# Patient Record
Sex: MALE | Race: OTHER | Employment: FULL TIME | URBAN - METROPOLITAN AREA
[De-identification: names, ages, dates, MRNs, and addresses within clinical notes are randomized per-mention and may not be internally consistent; named-entity substitution may affect disease eponyms.]

---

## 2017-02-02 ENCOUNTER — HOSPITAL ENCOUNTER (EMERGENCY)
Age: 67
Discharge: HOME | End: 2017-02-02
Attending: EMERGENCY MEDICINE
Payer: MEDICARE

## 2017-02-02 ENCOUNTER — OFFICE VISIT (OUTPATIENT)
Dept: FAMILY MEDICINE CLINIC | Age: 67
End: 2017-02-02

## 2017-02-02 ENCOUNTER — APPOINTMENT (OUTPATIENT)
Dept: CT IMAGING | Age: 67
End: 2017-02-02
Attending: EMERGENCY MEDICINE
Payer: MEDICARE

## 2017-02-02 VITALS
HEART RATE: 65 BPM | BODY MASS INDEX: 28.75 KG/M2 | DIASTOLIC BLOOD PRESSURE: 86 MMHG | OXYGEN SATURATION: 94 % | RESPIRATION RATE: 16 BRPM | SYSTOLIC BLOOD PRESSURE: 153 MMHG | HEIGHT: 65 IN | WEIGHT: 172.56 LBS | TEMPERATURE: 98 F

## 2017-02-02 DIAGNOSIS — G51.0 BELL'S PALSY: Primary | ICD-10-CM

## 2017-02-02 LAB
ALBUMIN SERPL BCP-MCNC: 3.9 G/DL (ref 3.5–5)
ALBUMIN/GLOB SERPL: 1 {RATIO} (ref 1.1–2.2)
ALP SERPL-CCNC: 80 U/L (ref 45–117)
ALT SERPL-CCNC: 38 U/L (ref 12–78)
ANION GAP BLD CALC-SCNC: 8 MMOL/L (ref 5–15)
AST SERPL W P-5'-P-CCNC: 25 U/L (ref 15–37)
BILIRUB SERPL-MCNC: 0.6 MG/DL (ref 0.2–1)
BUN SERPL-MCNC: 16 MG/DL (ref 6–20)
BUN/CREAT SERPL: 18 (ref 12–20)
CALCIUM SERPL-MCNC: 9.2 MG/DL (ref 8.5–10.1)
CHLORIDE SERPL-SCNC: 104 MMOL/L (ref 97–108)
CO2 SERPL-SCNC: 29 MMOL/L (ref 21–32)
CREAT SERPL-MCNC: 0.88 MG/DL (ref 0.7–1.3)
ERYTHROCYTE [DISTWIDTH] IN BLOOD BY AUTOMATED COUNT: 12.3 % (ref 11.5–14.5)
GLOBULIN SER CALC-MCNC: 3.9 G/DL (ref 2–4)
GLUCOSE SERPL-MCNC: 114 MG/DL (ref 65–100)
HCT VFR BLD AUTO: 45.9 % (ref 36.6–50.3)
HGB BLD-MCNC: 15.5 G/DL (ref 12.1–17)
MCH RBC QN AUTO: 29.7 PG (ref 26–34)
MCHC RBC AUTO-ENTMCNC: 33.8 G/DL (ref 30–36.5)
MCV RBC AUTO: 87.9 FL (ref 80–99)
PLATELET # BLD AUTO: 235 K/UL (ref 150–400)
POTASSIUM SERPL-SCNC: 3.8 MMOL/L (ref 3.5–5.1)
PROT SERPL-MCNC: 7.8 G/DL (ref 6.4–8.2)
RBC # BLD AUTO: 5.22 M/UL (ref 4.1–5.7)
SODIUM SERPL-SCNC: 141 MMOL/L (ref 136–145)
WBC # BLD AUTO: 8.3 K/UL (ref 4.1–11.1)

## 2017-02-02 PROCEDURE — 70450 CT HEAD/BRAIN W/O DYE: CPT

## 2017-02-02 PROCEDURE — 74011636637 HC RX REV CODE- 636/637: Performed by: EMERGENCY MEDICINE

## 2017-02-02 PROCEDURE — 85027 COMPLETE CBC AUTOMATED: CPT | Performed by: EMERGENCY MEDICINE

## 2017-02-02 PROCEDURE — 99284 EMERGENCY DEPT VISIT MOD MDM: CPT

## 2017-02-02 PROCEDURE — 80053 COMPREHEN METABOLIC PANEL: CPT | Performed by: EMERGENCY MEDICINE

## 2017-02-02 RX ORDER — PREDNISONE 20 MG/1
60 TABLET ORAL DAILY
Qty: 18 TAB | Refills: 0 | Status: SHIPPED | OUTPATIENT
Start: 2017-02-02 | End: 2017-02-08

## 2017-02-02 RX ORDER — PREDNISONE 20 MG/1
60 TABLET ORAL
Status: COMPLETED | OUTPATIENT
Start: 2017-02-02 | End: 2017-02-02

## 2017-02-02 RX ADMIN — PREDNISONE 60 MG: 20 TABLET ORAL at 13:54

## 2017-02-02 NOTE — ED PROVIDER NOTES
HPI Comments: 73 y.o. male with past medical history significant for hyperlipidemia, lower urinary tract symptoms, erectile dysfunction, and DM who presents from home with chief complaint of facial droop. Pt c/o facial droop that onset 20 hours ago when he got home from work and his wife noticed the R side of his face drooping. Pt states that he has R sided neck pain, R sided HA, and he can't taste. Pt denies taking any pain medication at home, just 1 pill for his prostate. Pt denies any hx of heart disease or stroke. Pt denies any numbness or weakness in arms or legs. There are no other acute medical concerns at this time. PCP: Last Quach NP    Note written by John Mccrary, as dictated by Mitchell Parmar MD 1:53 PM         The history is provided by the patient. No  was used. Past Medical History:   Diagnosis Date    Diabetes mellitus type 2, controlled ([address]     Erectile dysfunction      with premature ejaculation    Hyperlipidemia     Lower urinary tract symptoms (LUTS)        History reviewed. No pertinent past surgical history. History reviewed. No pertinent family history. Social History     Social History    Marital status:      Spouse name: N/A    Number of children: N/A    Years of education: N/A     Occupational History    Not on file. Social History Main Topics    Smoking status: Never Smoker    Smokeless tobacco: Never Used    Alcohol use No    Drug use: No    Sexual activity: Not on file     Other Topics Concern    Not on file     Social History Narrative    10/2015: Finished high school in [place], He is working as a , used to work as a  in [place]. Lives with wife and son. ALLERGIES: Review of patient's allergies indicates no known allergies. Review of Systems   Constitutional: Negative for diaphoresis and fever. HENT: Negative for facial swelling.     Eyes: Negative for visual disturbance. Respiratory: Negative for cough. Cardiovascular: Negative for chest pain. Gastrointestinal: Negative for abdominal pain. Genitourinary: Negative for dysuria. Musculoskeletal: Negative for joint swelling. Skin: Negative for rash. Neurological: Positive for facial asymmetry and numbness (R side of face). Negative for headaches. Hematological: Negative for adenopathy. Psychiatric/Behavioral: Negative for suicidal ideas. All other systems reviewed and are negative. Vitals:    02/02/17 1302 02/02/17 1320   BP: (!) 178/102 155/77   Pulse: 65    Resp: 16    Temp: 98.1 °F (36.7 °C)    Weight: 78.3 kg (172 lb 9 oz)    Height: 5' 5\" (1.651 m)             Physical Exam   Constitutional: He is oriented to person, place, and time. He appears well-developed and well-nourished. No distress. HENT:   Head: Normocephalic and atraumatic. Mouth/Throat: Oropharynx is clear and moist.   Eyes: Pupils are equal, round, and reactive to light. Neck: Normal range of motion. Neck supple. Cardiovascular: Normal rate, regular rhythm, normal heart sounds and intact distal pulses. Pulmonary/Chest: Effort normal and breath sounds normal. No respiratory distress. Abdominal: Soft. Bowel sounds are normal. He exhibits no distension. There is no tenderness. Musculoskeletal: Normal range of motion. He exhibits no edema. Neurological: He is alert and oriented to person, place, and time. R side upper facial motor neuron weakness. R side lower facial droop   Skin: Skin is warm and dry. Nursing note and vitals reviewed. Note written by John Marin, as dictated by Shavon Perry MD 1:54 PM      MDM  Number of Diagnoses or Management Options  Bell's palsy:   Diagnosis management comments: A:  Symptoms suggestive of Bell's Palsy. Exam consistent with this. VS stable with elevated BP. Since h/o htn and DM, will check labs and head ct.     P:  Head ct  Labs  reassess    ED Course Procedures    Labs and head CT unremarkable. Will treat for Bell's Palsy with Prednisone. Stable for discharge home. F/u with PCP.

## 2017-02-02 NOTE — ED TRIAGE NOTES
Headache for a couples of days, right sided facial droop started yesterday, denies leg or arm weakness or numbness, denies fever, denies n/v, pt unable to lift right eyebrow

## 2017-02-02 NOTE — DISCHARGE INSTRUCTIONS
Bell's Palsy: Care Instructions  Your Care Instructions    Bell's palsy is paralysis or weakness of the muscles on one side of the face. Often people with Bell's palsy have a droop on one side of the mouth and have trouble completely shutting the eye on the same side. Bell's palsy can also interfere with the sense of taste. These things happen when a nerve in your face becomes inflamed. Bell's palsy is not caused by a stroke. The cause of the nerve inflammation is not known. But some experts think that a virus may cause it. Because of this, doctors sometimes prescribe antiviral medicine to treat it. You also may get medicine to reduce swelling. Bell's palsy usually gets better on its own in a few weeks or months. Follow-up care is a key part of your treatment and safety. Be sure to make and go to all appointments, and call your doctor if you are having problems. It's also a good idea to know your test results and keep a list of the medicines you take. How can you care for yourself at home? · Take your medicines exactly as prescribed. Call your doctor if you think you are having a problem with your medicine. You will get more details on the specific medicines your doctor prescribes. · Use artificial tears or ointment if your eyes are too dry. Bell's palsy can make your lower eyelid droop, causing a dry eye. · If you cannot completely close your eye, consider using an eye patch while you sleep. · Help yourself blink by using your finger to close and open your eyelid. This may help keep your eye moist.  · Wear glasses or goggles to keep dust and dirt out of your eye. · As feeling comes back to your face, massage your forehead, cheeks, and lips. Massage may make the muscles in your face stronger. · Brush and floss your teeth often to help prevent tooth decay. Bell's palsy can dry up the spit on one side of your mouth. This increases the risk of tooth decay. When should you call for help?   Call 911 anytime you think you may need emergency care. For example, call if:  · You have symptoms of a stroke. These may include:  ¨ Sudden numbness, tingling, weakness, or loss of movement in your face, arm, or leg, especially on only one side of your body. ¨ Sudden vision changes. ¨ Sudden trouble speaking. ¨ Sudden confusion or trouble understanding simple statements. ¨ Sudden problems with walking or balance. ¨ A sudden, severe headache that is different from past headaches. Call your doctor now or seek immediate medical care if:  · You have numbness or weakness that spreads beyond one side of your face. · You have a skin rash or eye pain or redness, or light bothers your eyes. · You have a new or worse headache. Watch closely for changes in your health, and be sure to contact your doctor if:  · You do not get better as expected. Where can you learn more? Go to http://jayda-reji.info/. Enter P168 in the search box to learn more about \"Bell's Palsy: Care Instructions. \"  Current as of: February 19, 2016  Content Version: 11.1  © 3833-9576 [employer]. Care instructions adapted under license by [employer] (which disclaims liability or warranty for this information). If you have questions about a medical condition or this instruction, always ask your healthcare professional. [address] any warranty or liability for your use of this information. Bell's Palsy: Care Instructions  Your Care Instructions    Bell's palsy is paralysis or weakness of the muscles on one side of the face. Often people with Bell's palsy have a droop on one side of the mouth and have trouble completely shutting the eye on the same side. Bell's palsy can also interfere with the sense of taste. These things happen when a nerve in your face becomes inflamed. Bell's palsy is not caused by a stroke. The cause of the nerve inflammation is not known.  But some experts think that a virus may cause it. Because of this, doctors sometimes prescribe antiviral medicine to treat it. You also may get medicine to reduce swelling. Bell's palsy usually gets better on its own in a few weeks or months. Follow-up care is a key part of your treatment and safety. Be sure to make and go to all appointments, and call your doctor if you are having problems. It's also a good idea to know your test results and keep a list of the medicines you take. How can you care for yourself at home? · Take your medicines exactly as prescribed. Call your doctor if you think you are having a problem with your medicine. You will get more details on the specific medicines your doctor prescribes. · Use artificial tears or ointment if your eyes are too dry. Bell's palsy can make your lower eyelid droop, causing a dry eye. · If you cannot completely close your eye, consider using an eye patch while you sleep. · Help yourself blink by using your finger to close and open your eyelid. This may help keep your eye moist.  · Wear glasses or goggles to keep dust and dirt out of your eye. · As feeling comes back to your face, massage your forehead, cheeks, and lips. Massage may make the muscles in your face stronger. · Brush and floss your teeth often to help prevent tooth decay. Bell's palsy can dry up the spit on one side of your mouth. This increases the risk of tooth decay. When should you call for help? Call 911 anytime you think you may need emergency care. For example, call if:  · You have symptoms of a stroke. These may include:  ¨ Sudden numbness, tingling, weakness, or loss of movement in your face, arm, or leg, especially on only one side of your body. ¨ Sudden vision changes. ¨ Sudden trouble speaking. ¨ Sudden confusion or trouble understanding simple statements. ¨ Sudden problems with walking or balance. ¨ A sudden, severe headache that is different from past headaches.   Call your doctor now or seek immediate medical care if:  · You have numbness or weakness that spreads beyond one side of your face. · You have a skin rash or eye pain or redness, or light bothers your eyes. · You have a new or worse headache. Watch closely for changes in your health, and be sure to contact your doctor if:  · You do not get better as expected. Where can you learn more? Go to http://jayda-reji.info/. Enter P168 in the search box to learn more about \"Bell's Palsy: Care Instructions. \"  Current as of: February 19, 2016  Content Version: 11.1  © 4328-4947 [employer]. Care instructions adapted under license by [employer] (which disclaims liability or warranty for this information). If you have questions about a medical condition or this instruction, always ask your healthcare professional. [address] any warranty or liability for your use of this information. We hope that we have addressed all of your medical concerns. The examination and treatment you received in the Emergency Department were for an emergent problem and were not intended as complete care. It is important that you follow up with your healthcare provider(s) for ongoing care. If your symptoms worsen or do not improve as expected, and you are unable to reach your usual health care provider(s), you should return to the Emergency Department. Today's healthcare is undergoing tremendous change, and patient satisfaction surveys are one of the many tools to assess the quality of medical care. You may receive a survey from the [employer] organization regarding your experience in the Emergency Department. I hope that your experience has been completely positive, particularly the medical care that I provided. As such, please participate in the survey; anything less than excellent does not meet my expectations or intentions.         [address] [facility] and [address] [place] [address] participate in nationally recognized quality of care measures. If your blood pressure is greater than 120/80, as reported below, we urge that you seek medical care to address the potential of high blood pressure, commonly known as hypertension. Hypertension can be hereditary or can be caused by certain medical conditions, pain, stress, or \"white coat syndrome. \"       Please make an appointment with your health care provider(s) for follow up of your Emergency Department visit. VITALS:   Patient Vitals for the past 8 hrs:   Temp Pulse Resp BP SpO2   02/02/17 1400 - 62 11 157/82 95 %   02/02/17 1330 - 65 14 170/76 97 %   02/02/17 1320 - - - 155/77 -   02/02/17 1302 98.1 °F (36.7 °C) 65 16 (!) 178/102 -          Thank you for allowing us to provide you with medical care today. We realize that you have many choices for your emergency care needs. Please choose us in the future for any continued health care needs.       Dickson Savage MD    [facility]   Office: [phone]            Recent Results (from the past 24 hour(s))   CBC W/O DIFF    Collection Time: 02/02/17  1:06 PM   Result Value Ref Range    WBC 8.3 4.1 - 11.1 K/uL    RBC 5.22 4.10 - 5.70 M/uL    HGB 15.5 12.1 - 17.0 g/dL    HCT 45.9 36.6 - 50.3 %    MCV 87.9 80.0 - 99.0 FL    MCH 29.7 26.0 - 34.0 PG    MCHC 33.8 30.0 - 36.5 g/dL    RDW 12.3 11.5 - 14.5 %    PLATELET 921 028 - 710 K/uL   METABOLIC PANEL, COMPREHENSIVE    Collection Time: 02/02/17  1:06 PM   Result Value Ref Range    Sodium 141 136 - 145 mmol/L    Potassium 3.8 3.5 - 5.1 mmol/L    Chloride 104 97 - 108 mmol/L    CO2 29 21 - 32 mmol/L    Anion gap 8 5 - 15 mmol/L    Glucose 114 (H) 65 - 100 mg/dL    BUN 16 6 - 20 MG/DL    Creatinine 0.88 0.70 - 1.30 MG/DL    BUN/Creatinine ratio 18 12 - 20      GFR est AA >60 >60 ml/min/1.73m2    GFR est non-AA >60 >60 ml/min/1.73m2    Calcium 9.2 8.5 - 10.1 MG/DL    Bilirubin, total 0.6 0.2 - 1.0 MG/DL ALT (SGPT) 38 12 - 78 U/L    AST (SGOT) 25 15 - 37 U/L    Alk. phosphatase 80 45 - 117 U/L    Protein, total 7.8 6.4 - 8.2 g/dL    Albumin 3.9 3.5 - 5.0 g/dL    Globulin 3.9 2.0 - 4.0 g/dL    A-G Ratio 1.0 (L) 1.1 - 2.2         Ct Head Wo Cont    Result Date: 2/2/2017  EXAM:  CT HEAD WO CONT INDICATION:   Facial muscle weakness/paralysis COMPARISON: None. TECHNIQUE: Unenhanced CT of the head was performed using 5 mm images. Brain and bone windows were generated. CT dose reduction was achieved through use of a standardized protocol tailored for this examination and automatic exposure control for dose modulation. FINDINGS: The ventricles and sulci are normal in size, shape and configuration and midline. There is no significant white matter disease. There is no intracranial hemorrhage, extra-axial collection, mass, mass effect or midline shift. The basilar cisterns are open. No acute infarct is identified. The bone windows demonstrate no abnormalities. The visualized portions of the paranasal sinuses and mastoid air cells are clear.      IMPRESSION: No acute intracranial abnormality

## 2017-02-02 NOTE — PROGRESS NOTES
Pt presented for right sided facial weakness he woke up with this morning (hours ago). Never had this before. Some difficulty tasting coffee. Some pain on right side of face for 3 days prior, no rash. Otherwise feeling well. Speech sounds normal.  Right sided facial weakness pronounced. After discussion at the  we sent him to ER. I suspect he has [address] but would benefit from stroke work up given his age and diabetes.

## 2017-02-03 ENCOUNTER — PATIENT OUTREACH (OUTPATIENT)
Dept: FAMILY MEDICINE CLINIC | Age: 67
End: 2017-02-03

## 2017-02-07 ENCOUNTER — OFFICE VISIT (OUTPATIENT)
Dept: FAMILY MEDICINE CLINIC | Age: 67
End: 2017-02-07

## 2017-02-07 VITALS
HEIGHT: 65 IN | DIASTOLIC BLOOD PRESSURE: 87 MMHG | HEART RATE: 72 BPM | TEMPERATURE: 98.5 F | WEIGHT: 175.8 LBS | SYSTOLIC BLOOD PRESSURE: 149 MMHG | BODY MASS INDEX: 29.29 KG/M2 | RESPIRATION RATE: 12 BRPM | OXYGEN SATURATION: 95 %

## 2017-02-07 DIAGNOSIS — E78.5 DYSLIPIDEMIA: ICD-10-CM

## 2017-02-07 DIAGNOSIS — E11.65 TYPE 2 DIABETES MELLITUS WITH HYPERGLYCEMIA, WITHOUT LONG-TERM CURRENT USE OF INSULIN (HCC): Primary | ICD-10-CM

## 2017-02-07 DIAGNOSIS — G51.0 RIGHT-SIDED BELL'S PALSY: ICD-10-CM

## 2017-02-07 DIAGNOSIS — N40.1 BENIGN NON-NODULAR PROSTATIC HYPERPLASIA WITH LOWER URINARY TRACT SYMPTOMS: ICD-10-CM

## 2017-02-07 DIAGNOSIS — Z71.89 ACP (ADVANCE CARE PLANNING): ICD-10-CM

## 2017-02-07 DIAGNOSIS — Z00.00 MEDICARE ANNUAL WELLNESS VISIT, INITIAL: ICD-10-CM

## 2017-02-07 RX ORDER — METFORMIN HYDROCHLORIDE 500 MG/1
500 TABLET ORAL 2 TIMES DAILY WITH MEALS
Qty: 60 TAB | Refills: 5 | Status: SHIPPED | OUTPATIENT
Start: 2017-02-07 | End: 2017-12-04 | Stop reason: DRUGHIGH

## 2017-02-07 RX ORDER — ACYCLOVIR 400 MG/1
400 TABLET ORAL
Qty: 35 TAB | Refills: 0 | Status: SHIPPED | OUTPATIENT
Start: 2017-02-07 | End: 2017-03-06 | Stop reason: SDUPTHER

## 2017-02-07 RX ORDER — TAMSULOSIN HYDROCHLORIDE 0.4 MG/1
0.4 CAPSULE ORAL DAILY
Qty: 30 CAP | Refills: 5 | Status: SHIPPED | OUTPATIENT
Start: 2017-02-07 | End: 2017-06-27 | Stop reason: SDUPTHER

## 2017-02-07 RX ORDER — ROSUVASTATIN CALCIUM 10 MG/1
10 TABLET, COATED ORAL
Qty: 30 TAB | Refills: 5 | Status: SHIPPED | OUTPATIENT
Start: 2017-02-07 | End: 2017-12-31 | Stop reason: SDUPTHER

## 2017-02-07 NOTE — PROGRESS NOTES
1. Have you been to the ER, urgent care clinic since your last visit? Hospitalized since your last visit? 2/2/17- [facility]- for facial droop    2. Have you seen or consulted any other health care providers outside of the [address] [place] [address] since your last visit? Include any pap smears or colon screening.  No      Chief Complaint   Patient presents with    Facial Droop     noticed last thursday 2/2/17- went to [address]    Headache     began 1/28/17- very bad- head pain got worse on the thursday 2/2/17

## 2017-02-07 NOTE — MR AVS SNAPSHOT
Visit Information Date & Time Provider Department Dept. Phone Encounter #  
 2/7/2017  8:30 AM Naz Cardoza  [facility] [address] [phone] [id number] Follow-up Instructions Return in about 4 months (around 6/7/2017) for diabetes. Upcoming Health Maintenance Date Due  
 FOOT EXAM Q1 2/8/1961 MICROALBUMIN Q1 2/8/1961 COLONOSCOPY 2/8/1969 ZOSTER VACCINE AGE 60> 2/8/2011 Pneumococcal 65+ Low/Medium Risk (1 of 2 - PCV13) 2/8/2016 INFLUENZA AGE 9 TO ADULT 8/1/2016 HEMOGLOBIN A1C Q6M 5/2/2017 LIPID PANEL Q1 11/2/2017 MEDICARE YEARLY EXAM 11/3/2017 EYE EXAM RETINAL OR DILATED Q1 12/14/2017 GLAUCOMA SCREENING Q2Y 12/14/2018 DTaP/Tdap/Td series (2 - Td) 12/14/2025 Allergies as of 2/7/2017  Review Complete On: 2/7/2017 By: Naz Cardoza NP No Known Allergies Current Immunizations  Reviewed on 12/14/2015 Name Date Influenza Vaccine (Quad) PF 10/27/2015 Tdap 12/14/2015 Not reviewed this visit You Were Diagnosed With   
  
 Codes Comments Type 2 diabetes mellitus with hyperglycemia, without long-term current use of insulin (HCC)    -  Primary ICD-10-CM: E11.65 ICD-9-CM: 250.00, 790.29 Dyslipidemia     ICD-10-CM: E78.5 ICD-9-CM: 272.4 Benign non-nodular prostatic hyperplasia with lower urinary tract symptoms     ICD-10-CM: N40.1 ICD-9-CM: 600.91 Right-sided Bell's palsy     ICD-10-CM: G51.0 ICD-9-CM: 351.0 Vitals BP Pulse Temp Resp Height(growth percentile) Weight(growth percentile) 149/87 (BP 1 Location: Left arm, BP Patient Position: Sitting) 72 98.5 °F (36.9 °C) (Oral) 12 5' 5\" (1.651 m) 175 lb 12.8 oz (79.7 kg) SpO2 BMI Smoking Status 95% 29.25 kg/m2 Never Smoker Vitals History BMI and BSA Data Body Mass Index Body Surface Area  
 29.25 kg/m 2 1.91 m 2 Preferred Pharmacy Pharmacy Name Phone [facility] [phone] [facility] [phone] Your Updated Medication List  
  
   
This list is accurate as of: 2/7/17  8:57 AM.  Always use your most recent med list.  
  
  
  
  
 acyclovir 400 mg tablet Commonly known as:  ZOVIRAX Take 1 Tab by mouth five (5) times daily for 7 days. Blood-Glucose Meter Misc 1 Device by Does Not Apply route daily for 360 doses. Check sugar daily , dx 250.00  
  
 glucose blood VI test strips strip Commonly known as:  blood glucose test  
daily  
  
 ibuprofen 600 mg tablet Commonly known as:  MOTRIN Take 1 Tab by mouth every six (6) hours as needed for Pain. Lancets Misc  
daily  
  
 metFORMIN 500 mg tablet Commonly known as:  GLUCOPHAGE Take 1 Tab by mouth two (2) times daily (with meals). miscellaneous medical supply Misc Penile Suction device for Erectile dysfunction. predniSONE 20 mg tablet Commonly known as:  Teresa Greek Take 3 Tabs by mouth daily for 6 days. rosuvastatin 10 mg tablet Commonly known as:  CRESTOR Take 1 Tab by mouth nightly. For cholesterol  
  
 tamsulosin 0.4 mg capsule Commonly known as:  FLOMAX Take 1 Cap by mouth daily. For prostate Prescriptions Sent to Pharmacy Refills  
 rosuvastatin (CRESTOR) 10 mg tablet 5 Sig: Take 1 Tab by mouth nightly. For cholesterol Class: Normal  
 Pharmacy: [facility] [address] [place] [address] Ph #: [phone] Route: Oral  
 metFORMIN (GLUCOPHAGE) 500 mg tablet 5 Sig: Take 1 Tab by mouth two (2) times daily (with meals). Class: Normal  
 Pharmacy: [facility] [address] [place] [address] Ph #: [phone] Route: Oral  
 tamsulosin (FLOMAX) 0.4 mg capsule 5 Sig: Take 1 Cap by mouth daily. For prostate Class: Normal  
 Pharmacy: [facility] [address] [place] [address] Ph #: [phone]  Route: Oral  
 acyclovir (ZOVIRAX) 400 mg tablet 0 Sig: Take 1 Tab by mouth five (5) times daily for 7 days. Class: Normal  
 Pharmacy: [address] [facility] #: [phone] Route: Oral  
  
We Performed the Following HEMOGLOBIN A1C WITH EAG [71209 CPT(R)] LIPID PANEL [46773 CPT(R)] MICROALBUMIN, UR, RAND W/ MICROALBUMIN/CREA RATIO [id number] CPT(R)] Follow-up Instructions Return in about 4 months (around 6/7/2017) for diabetes. Patient Instructions Parálisis facial de Patel: Instrucciones de cuidado - [ Bell's Palsy: Care Instructions ] Instrucciones de cuidado La parálisis facial de Patel es andrew parálisis o debilitamiento de los músculos de un lado de la luis alberto. Las personas con parálisis facial de Patel suelen tener caído un lado de la boca y les maurice trabajo cerrar por completo el farnaz de adam mismo lado. La parálisis facial de Patel puede interferir también con el sentido del gusto. [place] sucede cuando se inflama un nervio de la luis alberto. La causa de la parálisis facial de Patel no es un ataque cerebral. No se conoce la causa de esta inflamación del nervio. [place] algunos expertos piensan que la causa podría ser un virus. Debido a esto, en ocasiones los médicos recetan un medicamento antiviral para tratarla. También podrían darle medicamentos para reducir la hinchazón. La parálisis facial de Patel por lo general mejora por sí valerie en algunas semanas o meses. La atención de seguimiento es andrew parte clave de persaud tratamiento y seguridad. Asegúrese de hacer y acudir a todas las citas, y llame a persaud médico si está teniendo problemas. También es andrew buena idea saber los resultados de silverio exámenes y mantener andrew lista de los medicamentos que sami. Cómo puede cuidarse en el hogar? · [employer] medicamentos exactamente clement le fueron recetados. Llame a persaud médico si goldie estar teniendo problemas con persaud medicamento.  Sim Reels detalles sobre los medicamentos específicos recetados por persaud médico. 
· Use lágrimas artificiales o pomada si se le secan demasiado los ojos. La parálisis facial de Patel puede causar la caída del párpado inferior, lo que produce sequedad en el farnaz. · Si no puede cerrar el farnaz por completo, piense en usar un parche para dormir. · Ayúdese a parpadear usando un dedo para cerrar y abrir el párpado. [place] podría ayudar a mantener el farnaz húmedo. · Use anteojos o gafas para prevenir que el polvo y la logan entren en el farnaz. · A medida que recupera la sensación en la luis alberto, masajee la frente, las mejillas y los labios. El masaje podría fortalecer los músculos de la luis alberto. · Cepíllese los dientes y use hilo dental con frecuencia para ayudar a prevenir las caries. La parálisis facial de Patel puede secar la saliva en un lado de persaud boca. [place] aumenta el riesgo de caries. Cuándo debe pedir ayuda? Llame al 911 en cualquier momento que considere que necesita atención de [place]. Por ejemplo, llame si: · Tiene síntomas de un ataque cerebral. Estos pueden incluir: 
¨ Entumecimiento, hormigueo, debilidad o parálisis repentinos en la luis alberto, el brazo o la pierna, sobre todo si ocurre en un solo lado del cuerpo. ¨ Cambios súbitos en la vista. ¨ Problemas repentinos para hablar. ¨ Confusión súbita o dificultad repentina para comprender frases sencillas. ¨ Problemas repentinos para caminar o mantener el equilibrio. ¨ Un dolor de rocio intenso y repentino, distinto a los jazzy de rocio anteriores. Llame a persaud médico ahora mismo o busque atención médica inmediata si: 
· Siente entumecimiento o debilidad que se expande más allá de un lado de la luis alberto. · Tiene un salpullido en la piel o dolor o enrojecimiento en el farnaz, o le [email or web address]. · Tiene nuevo dolor de rocio o edward empeora. Preste especial atención a los cambios en persaud dudley y asegúrese de comunicarse con persaud médico si: 
· No mejora clement se esperaba. Dónde puede encontrar más información en inglés? Ann Buitrago a http://jayda-reji.info/. Moira Noriega P168 en la búsqueda para aprender más acerca de \"Parálisis facial de Patel: Instrucciones de cuidado - [ Bell's Palsy: Care Instructions ]. \" 
Revisado: 19 febrero, 2016 Versión del contenido: 11.1 © 2367-7787 Healthwise, Incorporated. Las instrucciones de cuidado fueron adaptadas bajo licencia por Good Help Connections (which disclaims liability or warranty for this information). Si usted tiene [place] afección médica o sobre estas instrucciones, siempre pregunte a persaud profesional de dudley. Healthwise, Incorporated niega toda garantía o responsabilidad por persaud uso de esta información. Introducing [facility]! Mariluz Jordan introduces Streem[employer] patient portal. Now you can access parts of your medical record, email your doctor's office, and request medication refills online. 1. In your internet browser, go to [email or web address] 2. Click on the First Time User? Click Here link in the Sign In box. You will see the New Member Sign Up page. 3. Enter your Streemio Access Code exactly as it appears below. You will not need to use this code after youve completed the sign-up process. If you do not sign up before the expiration date, you must request a new code. · Streemio Access Code: [email or web address] Expires: 5/3/2017  1:19 PM 
 
4. Enter the last four digits of your Social Security Number (xxxx) and Date of Birth (mm/dd/yyyy) as indicated and click Submit. You will be taken to the next sign-up page. 5. Create a Pavilion Datat ID. This will be your [employer] login ID and cannot be changed, so think of one that is secure and easy to remember. 6. Create a [employer] password. You can change your password at any time. 7. Enter your Password Reset Question and Answer. This can be used at a later time if you forget your password. 8. Enter your e-mail address. You will receive e-mail notification when new information is available in [address] 9. Click Sign Up. You can now view and download portions of your medical record. 10. Click the Download Summary menu link to download a portable copy of your medical information. If you have questions, please visit the Frequently Asked Questions section of the InsuranceLibrary.[email or web address] website. Remember, InsuranceLibrary.com is NOT to be used for urgent needs. For medical emergencies, dial 911. Now available from your iPhone and Android! Please provide this summary of care documentation to your next provider. Your primary care clinician is listed as Power Echeverria. If you have any questions after today's visit, please call [phone].

## 2017-02-07 NOTE — PROGRESS NOTES
HISTORY OF PRESENT ILLNESS  Denis Metz is a 73 y.o. male. HPI  Cardiovascular Review:  The patient has diabetes, hypertension and hyperlipidemia. Patient was started on Crestor and reports taking the medication without side effects. Diet and Lifestyle: generally follows a low fat low cholesterol diet, generally follows a low sodium diet, follows a diabetic diet regularly, exercises sporadically, nonsmoker  Home BP Monitoring: is not measured at home. Pertinent ROS: not taking medications regularly as instructed, no TIA's, no chest pain on exertion, no dyspnea on exertion, no swelling of ankles. Diabetes Mellitus:  Diabetic ROS - medication compliance: no medication at present, diabetic diet compliance: compliant most of the time, home glucose monitoring: is performed sporadically, <150, further diabetic ROS: no polyuria or polydipsia, no chest pain, dyspnea or TIA's, no numbness, tingling or pain in extremities. BPH   Patient complains of lower urinary tract symptoms. Patient reports severe symptoms of BPH. Onset of symptoms was several months ago and was gradual in onset. He reports improvement of irritative and obstructive symptoms with Flomax. [place] Palsy  Patient presented [facility] on 2/2/17 for sudden facial numbness and drooping. CVA work up was negative. Patient was place done Prednisone taper. Reports continuing draining of right eye. Current Outpatient Prescriptions   Medication Sig Dispense Refill    rosuvastatin (CRESTOR) 10 mg tablet Take 1 Tab by mouth nightly. For cholesterol 30 Tab 5    metFORMIN (GLUCOPHAGE) 500 mg tablet Take 1 Tab by mouth two (2) times daily (with meals). 60 Tab 5    tamsulosin (FLOMAX) 0.4 mg capsule Take 1 Cap by mouth daily. For prostate 30 Cap 5    acyclovir (ZOVIRAX) 400 mg tablet Take 1 Tab by mouth five (5) times daily for 7 days. 35 Tab 0    predniSONE (DELTASONE) 20 mg tablet Take 3 Tabs by mouth daily for 6 days.  18 Tab 0    Blood-Glucose Meter misc 1 Device by Does Not Apply route daily for 360 doses. Check sugar daily , dx 250.00 1 Each 0    glucose blood VI test strips (BLOOD GLUCOSE TEST) strip daily 100 Strip 2    Lancets misc daily 100 Each 2    miscellaneous medical supply misc Penile Suction device for Erectile dysfunction. 1 Each 0    ibuprofen (MOTRIN) 600 mg tablet Take 1 Tab by mouth every six (6) hours as needed for Pain. 36 Tab 0     Past Medical History   Diagnosis Date    Bell's palsy 02/02/2017     went to [facility]    Diabetes mellitus type 2, controlled ([address]     Erectile dysfunction      with premature ejaculation    Hyperlipidemia     Lower urinary tract symptoms (LUTS)      History reviewed. No pertinent past surgical history. Lab Results   Component Value Date/Time    Hemoglobin A1c 6.8 11/02/2016 09:00 AM    Hemoglobin A1c 7.4 08/19/2016 11:36 AM    Hemoglobin A1c 7.1 10/08/2015 11:58 AM    Glucose 114 02/02/2017 01:06 PM    LDL,Direct 75 10/08/2015 11:58 AM    LDL, calculated Comment 11/02/2016 09:00 AM    Creatinine 0.88 02/02/2017 01:06 PM      Lab Results   Component Value Date/Time    Cholesterol, total 225 11/02/2016 09:00 AM    HDL Cholesterol 27 11/02/2016 09:00 AM    LDL,Direct 75 10/08/2015 11:58 AM    LDL, calculated Comment 11/02/2016 09:00 AM    Triglyceride 789 11/02/2016 09:00 AM    CHOL/HDL Ratio 6.9 10/08/2015 11:58 AM        Review of Systems   Constitutional: Negative for malaise/fatigue and weight loss. Cardiovascular: Negative for palpitations and leg swelling. Gastrointestinal: Negative for heartburn. Musculoskeletal: Negative for back pain, joint pain and myalgias. Neurological: Negative for dizziness and weakness. Psychiatric/Behavioral: Negative for depression. Physical Exam   Constitutional: He is oriented to person, place, and time. He appears well-developed and well-nourished. Neck: Normal range of motion. Neck supple. No JVD present.  Carotid bruit is not present. No thyromegaly present. Cardiovascular: Normal rate, regular rhythm and intact distal pulses. Exam reveals no gallop and no friction rub. No murmur heard. Pulmonary/Chest: Effort normal and breath sounds normal. No respiratory distress. Musculoskeletal: He exhibits no edema. Lymphadenopathy:     He has no cervical adenopathy. Neurological: He is alert and oriented to person, place, and time. Palsy of right facial nerve   Psychiatric: He has a normal mood and affect. His behavior is normal.   Nursing note and vitals reviewed. ASSESSMENT and PLAN  Mariya Ramirez was seen today for facial droop and headache. Diagnoses and all orders for this visit:    Type 2 diabetes mellitus with hyperglycemia, without long-term current use of insulin ([facility] [address]  Reviewed diet and lifestyle changes. -     metFORMIN (GLUCOPHAGE) 500 mg tablet; Take 1 Tab by mouth two (2) times daily (with meals). -     MICROALBUMIN, UR, RAND W/ MICROALBUMIN/CREA RATIO  -     HEMOGLOBIN A1C WITH EAG    Dyslipidemia  Recheck cholesterol.  -     rosuvastatin (CRESTOR) 10 mg tablet; Take 1 Tab by mouth nightly. For cholesterol  -     LIPID PANEL    Benign non-nodular prostatic hyperplasia with lower urinary tract symptoms  Stable, no changes to current therapy  -     Refill tamsulosin (FLOMAX) 0.4 mg capsule; Take 1 Cap by mouth daily. For prostate    Right-sided Bell's palsy  Will add anti-viral. Complete Prednisone. Lubricating eye drops PRN. Referral to neurology as needed. -     acyclovir (ZOVIRAX) 400 mg tablet; Take 1 Tab by mouth five (5) times daily for 7 days. I have discussed the diagnosis with the patient and the intended plan as seen in the above orders. The patient has received an after-visit summary along with patient information handout. I have discussed medication side effects and warnings with the patient as well. Follow-up Disposition:  Return in about 4 months (around 6/7/2017) for diabetes.

## 2017-02-07 NOTE — PATIENT INSTRUCTIONS
Parálisis facial de Patel: Instrucciones de cuidado - [ Bell's Palsy: Care Instructions ]  Instrucciones de cuidado    La parálisis facial de Patel es andrew parálisis o debilitamiento de los músculos de un lado de la luis alberto. Las personas con parálisis facial de Patel suelen tener caído un lado de la boca y les maurice trabajo cerrar por completo el farnaz de adam mismo lado. La parálisis facial de Patel puede interferir también con el sentido del gusto. [place] sucede cuando se inflama un nervio de la luis alberto. La causa de la parálisis facial de Patel no es un ataque cerebral. No se conoce la causa de esta inflamación del nervio. [place] algunos expertos piensan que la causa podría ser un virus. Debido a esto, en ocasiones los médicos recetan un medicamento antiviral para tratarla. También podrían darle medicamentos para reducir la hinchazón. La parálisis facial de Patel por lo general mejora por sí valerie en algunas semanas o meses. La atención de seguimiento es andrew parte clave de persaud tratamiento y seguridad. Asegúrese de hacer y acudir a todas las citas, y llame a persaud médico si está teniendo problemas. También es andrew buena idea saber los resultados de silverio exámenes y mantener andrew lista de los medicamentos que sami. ¿Cómo puede cuidarse en el hogar? · [employer] medicamentos exactamente clement le fueron recetados. Llame a persaud médico si goldie estar teniendo problemas con persaud medicamento. [place] [employer] medicamentos específicos recetados por persaud médico.  · Use lágrimas artificiales o pomada si se le secan demasiado los ojos. La parálisis facial de Patel puede causar la caída del párpado inferior, lo que produce sequedad en el farnaz. · Si no puede cerrar el farnaz por completo, piense en usar un parche para dormir. · Ayúdese a parpadear usando un dedo para cerrar y abrir el párpado. [place] podría ayudar a mantener el farnaz húmedo. · Use anteojos o gafas para prevenir que el polvo y la logan entren en el farnaz.   · A medida que recupera la sensación en la luis alberto, masajee la frente, las mejillas y los labios. El masaje podría fortalecer los músculos de la luis alberto. · Cepíllese los dientes y use hilo dental con frecuencia para ayudar a prevenir las caries. La parálisis facial de Patel puede secar la saliva en un lado de persaud boca. [place] aumenta el riesgo de caries. ¿Cuándo debe pedir ayuda? Llame al 911 en cualquier momento que considere que necesita atención de [place]. Por ejemplo, llame si:  · Tiene síntomas de un ataque cerebral. Estos pueden incluir:  ¨ Entumecimiento, hormigueo, debilidad o parálisis repentinos en la luis alberto, el brazo o la pierna, sobre todo si ocurre en un solo lado del cuerpo. ¨ Cambios súbitos en la vista. ¨ Problemas repentinos para hablar. ¨ Confusión súbita o dificultad repentina para comprender frases sencillas. ¨ Problemas repentinos para caminar o mantener el equilibrio. ¨ Un dolor de rocio intenso y repentino, distinto a los jazzy de rocio anteriores. Llame a persaud médico ahora mismo o busque atención médica inmediata si:  · Siente entumecimiento o debilidad que se expande más allá de un lado de la luis alberto. · Tiene un salpullido en la piel o dolor o enrojecimiento en el farnaz, o le [employer] [place]. · Tiene nuevo dolor de rocio o edward empeora. Preste especial atención a los cambios en persaud dudley y asegúrese de comunicarse con persaud médico si:  · No mejora clement se esperaba. ¿Dónde puede encontrar más información en inglés? Myrna Blum a http://jayda-reji.info/. Mauro Peterson P168 en la búsqueda para aprender más acerca de \"Parálisis facial de Patel: Instrucciones de cuidado - [ Bell's Palsy: Care Instructions ]. \"  Revisado: 19 febrero, 2016  Versión del contenido: 11.1  © 1974-2008 [employer]. Las instrucciones de cuidado fueron adaptadas bajo licencia por Good Help Connections (which disclaims liability or warranty for this information).  Si usted tiene [place] afección médica o sobre estas instrucciones, siempre pregunte a persaud profesional de dudley. [facility] niega toda garantía o responsabilidad por persaud uso de esta información.

## 2017-02-07 NOTE — LETTER
NOTIFICATION RETURN TO WORK / SCHOOL 
 
2/7/2017 8:54 AM 
 
Mr. Cristina Glasgow [place] [phone] To Whom It May Concern: 
 
Cristina Glasgow is currently under the care of YANG Barajas. He will return to work/school on: 2/9/17 If there are questions or concerns please have the patient contact our office. Sincerely, Joce Harris NP

## 2017-02-08 LAB
ALBUMIN/CREAT UR: 14.7 MG/G CREAT (ref 0–30)
CHOLEST SERPL-MCNC: 226 MG/DL (ref 100–199)
CREAT UR-MCNC: 156.9 MG/DL
EST. AVERAGE GLUCOSE BLD GHB EST-MCNC: 160 MG/DL
HBA1C MFR BLD: 7.2 % (ref 4.8–5.6)
HDLC SERPL-MCNC: 43 MG/DL
INTERPRETATION, 910389: NORMAL
LDLC SERPL CALC-MCNC: 121 MG/DL (ref 0–99)
MICROALBUMIN UR-MCNC: 23 UG/ML
TRIGL SERPL-MCNC: 309 MG/DL (ref 0–149)
VLDLC SERPL CALC-MCNC: 62 MG/DL (ref 5–40)

## 2017-02-08 NOTE — PROGRESS NOTES
Jeannie Puri is a 73 y.o. male and presents for annual Medicare Wellness Visit. Problem List: Reviewed with patient and discussed risk factors. Patient Active Problem List   Diagnosis Code    Erectile dysfunction N52.9    Premature ejaculation, acquired, generalized, severe F52.4    Benign non-nodular prostatic hyperplasia with lower urinary tract symptoms N40.1    Dyslipidemia E78.5    Uncontrolled type 2 diabetes mellitus without complication, without long-term current use of insulin (HCC) E11.65    Hypertriglyceridemia E78.1    Bell's palsy G51.0    Nuclear cataract H25.10    Diabetes mellitus ([facility] [address] E11.9       Current medical providers:  Patient Care Team:  Azael Coronel NP as PCP - General (Nurse Practitioner)    PSH: Reviewed with patient  History reviewed. No pertinent past surgical history. SH: Reviewed with patient  Social History   Substance Use Topics    Smoking status: Never Smoker    Smokeless tobacco: Never Used    Alcohol use No       FH: Reviewed with patient  Family History   Problem Relation Age of Onset    No Known Problems Mother     No Known Problems Father        Medications/Allergies: Reviewed with patient  Current Outpatient Prescriptions on File Prior to Visit   Medication Sig Dispense Refill    predniSONE (DELTASONE) 20 mg tablet Take 3 Tabs by mouth daily for 6 days. 18 Tab 0    Blood-Glucose Meter misc 1 Device by Does Not Apply route daily for 360 doses. Check sugar daily , dx 250.00 1 Each 0    glucose blood VI test strips (BLOOD GLUCOSE TEST) strip daily 100 Strip 2    Lancets misc daily 100 Each 2    miscellaneous medical supply misc Penile Suction device for Erectile dysfunction. 1 Each 0    ibuprofen (MOTRIN) 600 mg tablet Take 1 Tab by mouth every six (6) hours as needed for Pain. 40 Tab 0     No current facility-administered medications on file prior to visit.        No Known Allergies    Objective:  Visit Vitals    /87 (BP 1 Location: Left arm, BP Patient Position: Sitting)    Pulse 72    Temp 98.5 °F (36.9 °C) (Oral)    Resp 12    Ht 5' 5\" (1.651 m)    Wt 175 lb 12.8 oz (79.7 kg)    SpO2 95%    BMI 29.25 kg/m2    Body mass index is 29.25 kg/(m^2). Assessment of cognitive impairment: Alert and oriented x 3    Depression Screen:   PHQ 2 / 9, over the last two weeks 2/7/2017   Little interest or pleasure in doing things Not at all   Feeling down, depressed or hopeless Not at all   Total Score PHQ 2 0       Fall Risk Assessment:    Fall Risk Assessment, last 12 mths 2/7/2017   Able to walk? Yes   Fall in past 12 months? No       Functional Ability:   Does the patient exhibit a steady gait? yes   How long did it take the patient to get up and walk from a sitting position? 3 sec   Is the patient self reliant?  (ie can do own laundry, meals, household chores)  yes     Does the patient handle his/her own medications? yes     Does the patient handle his/her own money? yes     Is the patients home safe (ie good lighting, handrails on stairs and bath, etc.)? yes     Did you notice or did patient express any hearing difficulties? no     Did you notice or did patient express any vision difficulties?   no     Were distance and reading eye charts used? no       Advance Care Planning:   Patient was offered the opportunity to discuss advance care planning:  yes     Does patient have an Advance Directive:  no   If no, did you provide information on Caring Connections?   yes       Plan:      Orders Placed This Encounter    MICROALBUMIN, UR, RAND W/ MICROALBUMIN/CREA RATIO    LIPID PANEL    HEMOGLOBIN A1C WITH EAG    rosuvastatin (CRESTOR) 10 mg tablet    metFORMIN (GLUCOPHAGE) 500 mg tablet    tamsulosin (FLOMAX) 0.4 mg capsule    acyclovir (ZOVIRAX) 400 mg tablet       Health Maintenance   Topic Date Due    FOOT EXAM Q1  02/08/1961    MICROALBUMIN Q1  02/08/1961    COLONOSCOPY  02/08/1969    ZOSTER VACCINE AGE 60> 02/08/2011    Pneumococcal 65+ Low/Medium Risk (1 of 2 - PCV13) 02/08/2016    INFLUENZA AGE 9 TO ADULT  08/01/2016    HEMOGLOBIN A1C Q6M  05/02/2017    LIPID PANEL Q1  11/02/2017    MEDICARE YEARLY EXAM  11/03/2017    EYE EXAM RETINAL OR DILATED Q1  12/14/2017    GLAUCOMA SCREENING Q2Y  12/14/2018    DTaP/Tdap/Td series (2 - Td) 12/14/2025    Hepatitis C Screening  Completed       *Patient verbalized understanding and agreement with the plan. A copy of the After Visit Summary with personalized health plan was given to the patient today.

## 2017-03-06 DIAGNOSIS — G51.0 RIGHT-SIDED BELL'S PALSY: ICD-10-CM

## 2017-03-06 RX ORDER — ACYCLOVIR 400 MG/1
TABLET ORAL
Qty: 35 TAB | Refills: 0 | Status: SHIPPED | OUTPATIENT
Start: 2017-03-06

## 2017-03-15 ENCOUNTER — DOCUMENTATION ONLY (OUTPATIENT)
Dept: FAMILY MEDICINE CLINIC | Age: 67
End: 2017-03-15

## 2017-03-15 NOTE — PROGRESS NOTES
Fax received from [facility] for a prednisone 20 mg that was written by a Dr. Charity Toledo. The medication is not listed as a current medication dn the script request should have been sent to Dr. Cassandra Goff. I made a note saying as much and faxed it back to [phone]. Fax and confirmation sent to scan.

## 2017-03-21 ENCOUNTER — OFFICE VISIT (OUTPATIENT)
Dept: FAMILY MEDICINE CLINIC | Age: 67
End: 2017-03-21

## 2017-03-21 VITALS
SYSTOLIC BLOOD PRESSURE: 147 MMHG | RESPIRATION RATE: 16 BRPM | WEIGHT: 171.4 LBS | OXYGEN SATURATION: 95 % | BODY MASS INDEX: 28.56 KG/M2 | DIASTOLIC BLOOD PRESSURE: 77 MMHG | TEMPERATURE: 98.8 F | HEART RATE: 95 BPM | HEIGHT: 65 IN

## 2017-03-21 DIAGNOSIS — H66.93 BILATERAL OTITIS MEDIA, UNSPECIFIED CHRONICITY, UNSPECIFIED OTITIS MEDIA TYPE: Primary | ICD-10-CM

## 2017-03-21 DIAGNOSIS — R05.9 COUGH: ICD-10-CM

## 2017-03-21 RX ORDER — ACETAMINOPHEN 325 MG/1
TABLET ORAL
COMMUNITY

## 2017-03-21 RX ORDER — AMOXICILLIN AND CLAVULANATE POTASSIUM 500; 125 MG/1; MG/1
1 TABLET, FILM COATED ORAL 2 TIMES DAILY
Qty: 20 TAB | Refills: 0 | Status: SHIPPED | OUTPATIENT
Start: 2017-03-21 | End: 2017-03-31

## 2017-03-21 RX ORDER — HYDROCODONE POLISTIREX AND CHLORPHENIRAMINE POLISTIREX 10; 8 MG/5ML; MG/5ML
1 SUSPENSION, EXTENDED RELEASE ORAL
Qty: 115 ML | Refills: 0 | Status: SHIPPED | OUTPATIENT
Start: 2017-03-21 | End: 2017-11-29

## 2017-03-21 RX ORDER — DEXTROMETHORPHAN POLISTIREX 30 MG/5ML
60 SUSPENSION ORAL 2 TIMES DAILY
COMMUNITY
End: 2017-11-29

## 2017-03-21 NOTE — LETTER
NOTIFICATION RETURN TO WORK  
 
3/21/2017 2:03 PM 
 
Mr. Octavia Hall [place] [phone] To Whom It May Concern: 
 
Octavia Hall is currently under the care of YANG Barajas. He will return to work 3/23/2017 If there are questions or concerns please have the patient contact our office. Sincerely, Lexx Cummings NP

## 2017-03-21 NOTE — PROGRESS NOTES
HISTORY OF PRESENT ILLNESS  Adiel Cavanaugh is a 78 y.o. male. HPIPatients to office today office today for cold like symptom, dry throat , dry cough, body aches, weakness, sneezing, and generalized feeling of not feeling well that started two days ago. He has take cough syrup, tylenol, and ibuprofen with minimal relief. Review of Systems   HENT: Positive for ear pain. Eyes: Positive for discharge. Watery eyes     Visit Vitals    /77 (BP 1 Location: Left arm, BP Patient Position: Sitting)    Pulse 95    Temp 98.8 °F (37.1 °C) (Oral)    Resp 16    Ht 5' 5\" (1.651 m)    Wt 171 lb 6.4 oz (77.7 kg)    SpO2 95%    BMI 28.52 kg/m2     Current Outpatient Prescriptions on File Prior to Visit   Medication Sig Dispense Refill    rosuvastatin (CRESTOR) 10 mg tablet Take 1 Tab by mouth nightly. For cholesterol 30 Tab 5    metFORMIN (GLUCOPHAGE) 500 mg tablet Take 1 Tab by mouth two (2) times daily (with meals). 60 Tab 5    tamsulosin (FLOMAX) 0.4 mg capsule Take 1 Cap by mouth daily. For prostate 30 Cap 5    Blood-Glucose Meter misc 1 Device by Does Not Apply route daily for 360 doses. Check sugar daily , dx 250.00 1 Each 0    glucose blood VI test strips (BLOOD GLUCOSE TEST) strip daily 100 Strip 2    Lancets misc daily 100 Each 2    ibuprofen (MOTRIN) 600 mg tablet Take 1 Tab by mouth every six (6) hours as needed for Pain. 40 Tab 0    miscellaneous medical supply misc Penile Suction device for Erectile dysfunction. 1 Each 0    acyclovir (ZOVIRAX) 400 mg tablet TAKE 1 TABLET BY MOUTH 5 TIMES DAILY FOR 7 DAYS 35 Tab 0     No current facility-administered medications on file prior to visit. Physical Exam   Constitutional: He is oriented to person, place, and time. He appears well-developed and well-nourished. HENT:   Head: Normocephalic and atraumatic. Right Ear: Tympanic membrane is erythematous. Left Ear: Tympanic membrane is erythematous.    Cardiovascular: Normal rate and regular rhythm. Pulmonary/Chest: Breath sounds normal.   Active sneezing    Abdominal: Soft. Bowel sounds are normal.   Neurological: He is alert and oriented to person, place, and time. Skin: Skin is warm and dry. Psychiatric: He has a normal mood and affect. ASSESSMENT and PLAN  Elia Capps was seen today for cold symptoms. Diagnoses and all orders for this visit:    Bilateral otitis media, unspecified chronicity, unspecified otitis media type  -     amoxicillin-clavulanate (AUGMENTIN) 500-125 mg per tablet; Take 1 Tab by mouth two (2) times a day for 10 days. Indications: ACUTE OTITIS MEDIA    Cough  -     chlorpheniramine-HYDROcodone (TUSSIONEX) 10-8 mg/5 mL suspension; Take 5 mL by mouth every twelve (12) hours as needed for Cough. Max Daily Amount: 10 mL. Indications: COUGH, Nasal Congestion     Complete all of your antibiotics as we discussed. Get plenty of rest, stay well hydrated. Should your symptoms does not improve contact our office for a visit.      Nadia Mello NP

## 2017-03-21 NOTE — PATIENT INSTRUCTIONS
Ear Infection (Otitis Media): Care Instructions  Your Care Instructions    An ear infection may start with a cold and affect the middle ear (otitis media). It can hurt a lot. Most ear infections clear up on their own in a couple of days. Most often you will not need antibiotics. This is because many ear infections are caused by a virus. Antibiotics don't work against a virus. Regular doses of pain medicines are the best way to reduce your fever and help you feel better. Follow-up care is a key part of your treatment and safety. Be sure to make and go to all appointments, and call your doctor if you are having problems. It's also a good idea to know your test results and keep a list of the medicines you take. How can you care for yourself at home? · Take pain medicines exactly as directed. ¨ If the doctor gave you a prescription medicine for pain, take it as prescribed. ¨ If you are not taking a prescription pain medicine, take an over-the-counter medicine, such as acetaminophen (Tylenol), ibuprofen (Advil, Motrin), or naproxen (Aleve). Read and follow all instructions on the label. ¨ Do not take two or more pain medicines at the same time unless the doctor told you to. Many pain medicines have acetaminophen, which is Tylenol. Too much acetaminophen (Tylenol) can be harmful. · Plan to take a full dose of pain reliever before bedtime. Getting enough sleep will help you get better. · Try a warm, moist washcloth on the ear. It may help relieve pain. · If your doctor prescribed antibiotics, take them as directed. Do not stop taking them just because you feel better. You need to take the full course of antibiotics. When should you call for help? Call your doctor now or seek immediate medical care if:  · You have new or increasing ear pain. · You have new or increasing pus or blood draining from your ear. · You have a fever with a stiff neck or a severe headache.   Watch closely for changes in your health, and be sure to contact your doctor if:  · You have new or worse symptoms. · You are not getting better after taking an antibiotic for 2 days. Where can you learn more? Go to http://jayda-reji.info/. Enter S360 in the search box to learn more about \"Ear Infection (Otitis Media): Care Instructions. \"  Current as of: July 29, 2016  Content Version: 11.1  © 3433-4768 [employer]. Care instructions adapted under license by [employer] (which disclaims liability or warranty for this information). If you have questions about a medical condition or this instruction, always ask your healthcare professional. [place] [address] any warranty or liability for your use of this information. Cough: Care Instructions  Your Care Instructions  A cough is your body's response to something that bothers your throat or airways. Many things can cause a cough. You might cough because of a cold or the flu, bronchitis, or asthma. Smoking, postnasal drip, allergies, and stomach acid that backs up into your throat also can cause coughs. A cough is a symptom, not a disease. Most coughs stop when the cause, such as a cold, goes away. You can take a few steps at home to cough less and feel better. Follow-up care is a key part of your treatment and safety. Be sure to make and go to all appointments, and call your doctor if you are having problems. It's also a good idea to know your test results and keep a list of the medicines you take. How can you care for yourself at home? · Drink lots of water and other fluids. This helps thin the mucus and soothes a dry or sore throat. Honey or lemon juice in hot water or tea may ease a dry cough. · Take cough medicine as directed by your doctor. · Prop up your head on pillows to help you breathe and ease a dry cough. · Try cough drops to soothe a dry or sore throat. Cough drops don't stop a cough.  Medicine-flavored cough drops are no better than candy-flavored drops or hard candy. · Do not smoke. Avoid secondhand smoke. If you need help quitting, talk to your doctor about stop-smoking programs and medicines. These can increase your chances of quitting for good. When should you call for help? Call 911 anytime you think you may need emergency care. For example, call if:  · You have severe trouble breathing. Call your doctor now or seek immediate medical care if:  · You cough up blood. · You have new or worse trouble breathing. · You have a new or higher fever. · You have a new rash. Watch closely for changes in your health, and be sure to contact your doctor if:  · You cough more deeply or more often, especially if you notice more mucus or a change in the color of your mucus. · You have new symptoms, such as a sore throat, an earache, or sinus pain. · You do not get better as expected. Where can you learn more? Go to http://jayda-reji.info/. Enter D279 in the search box to learn more about \"Cough: Care Instructions. \"  Current as of: May 27, 2016  Content Version: 11.1  © 9785-6692 [employer]. Care instructions adapted under license by [employer] (which disclaims liability or warranty for this information). If you have questions about a medical condition or this instruction, always ask your healthcare professional. [address] any warranty or liability for your use of this information.

## 2017-03-21 NOTE — MR AVS SNAPSHOT
Visit Information Date & Time Provider Department Dept. Phone Encounter #  
 3/21/2017  1:20 PM Cedric Salter  [facility] [phone] [id number] Your Appointments 4/7/2017  8:00 AM  
ROUTINE CARE with Julián Davison  [facility] ([address]) Appt Note: two month follow-up for bells palsy [address] [facility] [address]  
[phone]  
  
   
 Julian Reeves [address] [address]  
  
    
 6/7/2017  8:00 AM  
ROUTINE CARE with Julián Davison  [address] ([address]) Appt Note: four month follow-up for diabetes [address]  
[phone] Upcoming Health Maintenance Date Due  
 FOOT EXAM Q1 2/8/1961 COLONOSCOPY 2/8/1969 ZOSTER VACCINE AGE 60> 2/8/2011 Pneumococcal 65+ Low/Medium Risk (1 of 2 - PCV13) 2/8/2016 INFLUENZA AGE 9 TO ADULT 8/1/2016 HEMOGLOBIN A1C Q6M 8/7/2017 EYE EXAM RETINAL OR DILATED Q1 12/14/2017 MICROALBUMIN Q1 2/7/2018 LIPID PANEL Q1 2/7/2018 MEDICARE YEARLY EXAM 2/8/2018 GLAUCOMA SCREENING Q2Y 12/14/2018 DTaP/Tdap/Td series (2 - Td) 12/14/2025 Allergies as of 3/21/2017  Review Complete On: 3/21/2017 By: Cedric Salter NP No Known Allergies Current Immunizations  Reviewed on 12/14/2015 Name Date Influenza Vaccine (Quad) PF 10/27/2015 Tdap 12/14/2015 Not reviewed this visit You Were Diagnosed With   
  
 Codes Comments Bilateral otitis media, unspecified chronicity, unspecified otitis media type    -  Primary ICD-10-CM: H66.93 
ICD-9-CM: 382.9 Cough     ICD-10-CM: R05 ICD-9-CM: 242. 2 Vitals BP Pulse Temp Resp Height(growth percentile) Weight(growth percentile) 147/77 (BP 1 Location: Left arm, BP Patient Position: Sitting) 95 98.8 °F (37.1 °C) (Oral) 16 5' 5\" (1.651 m) 171 lb 6.4 oz (77.7 kg) SpO2 BMI Smoking Status 95% 28.52 kg/m2 Never Smoker Vitals History BMI and BSA Data Body Mass Index Body Surface Area 28.52 kg/m 2 1.89 m 2 Preferred Pharmacy Pharmacy Name Phone [facility] [address] [facility] [phone] Your Updated Medication List  
  
   
This list is accurate as of: 3/21/17  1:56 PM.  Always use your most recent med list.  
  
  
  
  
 acyclovir 400 mg tablet Commonly known as:  ZOVIRAX TAKE 1 TABLET BY MOUTH 5 TIMES DAILY FOR 7 DAYS  
  
 amoxicillin-clavulanate 500-125 mg per tablet Commonly known as:  AUGMENTIN Take 1 Tab by mouth two (2) times a day for 10 days. Indications: ACUTE OTITIS MEDIA Blood-Glucose Meter Misc 1 Device by Does Not Apply route daily for 360 doses. Check sugar daily , dx 250.00  
  
 chlorpheniramine-HYDROcodone 10-8 mg/5 mL suspension Commonly known as:  Dennison Cadet Take 5 mL by mouth every twelve (12) hours as needed for Cough. Max Daily Amount: 10 mL. Indications: COUGH, Nasal Congestion Delsym 30 mg/5 mL liquid Generic drug:  dextromethorphan Take 60 mg by mouth two (2) times a day. glucose blood VI test strips strip Commonly known as:  blood glucose test  
daily  
  
 ibuprofen 600 mg tablet Commonly known as:  MOTRIN Take 1 Tab by mouth every six (6) hours as needed for Pain. Lancets Misc  
daily  
  
 metFORMIN 500 mg tablet Commonly known as:  GLUCOPHAGE Take 1 Tab by mouth two (2) times daily (with meals). miscellaneous medical supply Misc Penile Suction device for Erectile dysfunction. rosuvastatin 10 mg tablet Commonly known as:  CRESTOR Take 1 Tab by mouth nightly. For cholesterol  
  
 tamsulosin 0.4 mg capsule Commonly known as:  FLOMAX Take 1 Cap by mouth daily. For prostate TYLENOL 325 mg tablet Generic drug:  acetaminophen Take  by mouth every four (4) hours as needed for Pain. Prescriptions Printed Refills  
 chlorpheniramine-HYDROcodone (TUSSIONEX) 10-8 mg/5 mL suspension 0 Sig: Take 5 mL by mouth every twelve (12) hours as needed for Cough. Max Daily Amount: 10 mL. Indications: COUGH, Nasal Congestion Class: Print Route: Oral  
  
Prescriptions Sent to Pharmacy Refills  
 amoxicillin-clavulanate (AUGMENTIN) 500-125 mg per tablet 0 Sig: Take 1 Tab by mouth two (2) times a day for 10 days. Indications: ACUTE OTITIS MEDIA Class: Normal  
 Pharmacy: [id number] [place] [address], [address] [facility] #: [phone] Route: Oral  
  
Patient Instructions Ear Infection (Otitis Media): Care Instructions Your Care Instructions An ear infection may start with a cold and affect the middle ear (otitis media). It can hurt a lot. Most ear infections clear up on their own in a couple of days. Most often you will not need antibiotics. This is because many ear infections are caused by a virus. Antibiotics don't work against a virus. Regular doses of pain medicines are the best way to reduce your fever and help you feel better. Follow-up care is a key part of your treatment and safety. Be sure to make and go to all appointments, and call your doctor if you are having problems. It's also a good idea to know your test results and keep a list of the medicines you take. How can you care for yourself at home? · Take pain medicines exactly as directed. ¨ If the doctor gave you a prescription medicine for pain, take it as prescribed. ¨ If you are not taking a prescription pain medicine, take an over-the-counter medicine, such as acetaminophen (Tylenol), ibuprofen (Advil, Motrin), or naproxen (Aleve). Read and follow all instructions on the label. ¨ Do not take two or more pain medicines at the same time unless the doctor told you to. Many pain medicines have acetaminophen, which is Tylenol. Too much acetaminophen (Tylenol) can be harmful. · Plan to take a full dose of pain reliever before bedtime. Getting enough sleep will help you get better. · Try a warm, moist washcloth on the ear. It may help relieve pain. · If your doctor prescribed antibiotics, take them as directed. Do not stop taking them just because you feel better. You need to take the full course of antibiotics. When should you call for help? Call your doctor now or seek immediate medical care if: 
· You have new or increasing ear pain. · You have new or increasing pus or blood draining from your ear. · You have a fever with a stiff neck or a severe headache. Watch closely for changes in your health, and be sure to contact your doctor if: 
· You have new or worse symptoms. · You are not getting better after taking an antibiotic for 2 days. Where can you learn more? Go to http://jayda-reji.info/. Enter H531 in the search box to learn more about \"Ear Infection (Otitis Media): Care Instructions. \" Current as of: July 29, 2016 Content Version: 11.1 © 3876-4948 [employer]. Care instructions adapted under license by [employer] (which disclaims liability or warranty for this information). If you have questions about a medical condition or this instruction, always ask your healthcare professional. [address] any warranty or liability for your use of this information. Cough: Care Instructions Your Care Instructions A cough is your body's response to something that bothers your throat or airways. Many things can cause a cough. You might cough because of a cold or the flu, bronchitis, or asthma. Smoking, postnasal drip, allergies, and stomach acid that backs up into your throat also can cause coughs. A cough is a symptom, not a disease. Most coughs stop when the cause, such as a cold, goes away. You can take a few steps at home to cough less and feel better. Follow-up care is a key part of your treatment and safety. Be sure to make and go to all appointments, and call your doctor if you are having problems. It's also a good idea to know your test results and keep a list of the medicines you take. How can you care for yourself at home? · Drink lots of water and other fluids. This helps thin the mucus and soothes a dry or sore throat. Honey or lemon juice in hot water or tea may ease a dry cough. · Take cough medicine as directed by your doctor. · Prop up your head on pillows to help you breathe and ease a dry cough. · Try cough drops to soothe a dry or sore throat. Cough drops don't stop a cough. Medicine-flavored cough drops are no better than candy-flavored drops or hard candy. · Do not smoke. Avoid secondhand smoke. If you need help quitting, talk to your doctor about stop-smoking programs and medicines. These can increase your chances of quitting for good. When should you call for help? Call 911 anytime you think you may need emergency care. For example, call if: 
· You have severe trouble breathing. Call your doctor now or seek immediate medical care if: 
· You cough up blood. · You have new or worse trouble breathing. · You have a new or higher fever. · You have a new rash. Watch closely for changes in your health, and be sure to contact your doctor if: 
· You cough more deeply or more often, especially if you notice more mucus or a change in the color of your mucus. · You have new symptoms, such as a sore throat, an earache, or sinus pain. · You do not get better as expected. Where can you learn more? Go to http://jayda-reji.info/. Enter D279 in the search box to learn more about \"Cough: Care Instructions. \" Current as of: May 27, 2016 Content Version: 11.1 © 2610-3638 [employer].  Care instructions adapted under license by [employer] (which disclaims liability or warranty for this information). If you have questions about a medical condition or this instruction, always ask your healthcare professional. [place] [address] any warranty or liability for your use of this information. Introducing [facility]! [facility] introduces [employer]ergy patient portal. Now you can access parts of your medical record, email your doctor's office, and request medication refills online. 1. In your internet browser, go to [email or web address] 2. Click on the First Time User? Click Here link in the Sign In box. You will see the New Member Sign Up page. 3. Enter your Resonergy Access Code exactly as it appears below. You will not need to use this code after youve completed the sign-up process. If you do not sign up before the expiration date, you must request a new code. · Res[employer]ergy Access Code: [email or web address] Expires: 5/3/2017  2:19 PM 
 
4. Enter the last four digits of your Social Security Number (xxxx) and Date of Birth (mm/dd/yyyy) as indicated and click Submit. You will be taken to the next sign-up page. 5. Create a Resonergy ID. This will be your Resonergy login ID and cannot be changed, so think of one that is secure and easy to remember. 6. Create a Resonergy password. You can change your password at any time. 7. Enter your Password Reset Question and Answer. This can be used at a later time if you forget your password. 8. Enter your e-mail address. You will receive e-mail notification when new information is available in [address] 9. Click Sign Up. You can now view and download portions of your medical record. 10. Click the Download Summary menu link to download a portable copy of your medical information. If you have questions, please visit the Frequently Asked Questions section of the Res[employer]ergy website. Remember, Res[employer]ergy is NOT to be used for urgent needs. For medical emergencies, dial 911. Now available from your iPhone and Android! Please provide this summary of care documentation to your next provider. Your primary care clinician is listed as Steffanie West. If you have any questions after today's visit, please call [phone].

## 2017-03-21 NOTE — PROGRESS NOTES
Chief Complaint   Patient presents with    Cold Symptoms     cough, nasal congestion, sneezing X 3 days      1. Have you been to the ER, urgent care clinic since your last visit? Hospitalized since your last visit? No    2. Have you seen or consulted any other health care providers outside of the [address] [place] [address] since your last visit? Include any pap smears or colon screening.  No

## 2017-06-05 ENCOUNTER — HOSPITAL ENCOUNTER (EMERGENCY)
Age: 67
Discharge: HOME | End: 2017-06-05
Attending: STUDENT IN AN ORGANIZED HEALTH CARE EDUCATION/TRAINING PROGRAM
Payer: MEDICARE

## 2017-06-05 VITALS
HEART RATE: 79 BPM | DIASTOLIC BLOOD PRESSURE: 80 MMHG | RESPIRATION RATE: 15 BRPM | SYSTOLIC BLOOD PRESSURE: 152 MMHG | OXYGEN SATURATION: 96 % | TEMPERATURE: 97.6 F

## 2017-06-05 DIAGNOSIS — S91.311A FOOT LACERATION, RIGHT, INITIAL ENCOUNTER: Primary | ICD-10-CM

## 2017-06-05 PROCEDURE — 74011250636 HC RX REV CODE- 250/636: Performed by: STUDENT IN AN ORGANIZED HEALTH CARE EDUCATION/TRAINING PROGRAM

## 2017-06-05 PROCEDURE — 96372 THER/PROPH/DIAG INJ SC/IM: CPT

## 2017-06-05 PROCEDURE — 99282 EMERGENCY DEPT VISIT SF MDM: CPT

## 2017-06-05 PROCEDURE — 90715 TDAP VACCINE 7 YRS/> IM: CPT | Performed by: STUDENT IN AN ORGANIZED HEALTH CARE EDUCATION/TRAINING PROGRAM

## 2017-06-05 RX ORDER — CEPHALEXIN 500 MG/1
500 CAPSULE ORAL 4 TIMES DAILY
Qty: 28 CAP | Refills: 0 | Status: SHIPPED | OUTPATIENT
Start: 2017-06-05 | End: 2017-06-12

## 2017-06-05 RX ADMIN — TETANUS TOXOID, REDUCED DIPHTHERIA TOXOID AND ACELLULAR PERTUSSIS VACCINE, ADSORBED 0.5 ML: 5; 2.5; 8; 8; 2.5 SUSPENSION INTRAMUSCULAR at 09:26

## 2017-06-05 NOTE — ED PROVIDER NOTES
HPI Comments: 78 y.o. male with past medical history significant for DM type 2 who presents from home with chief complaint of right foot wound. Pt reports that 2 days ago he was standing on a chair at home when his foot slid off the side of the chair and landed on a screw on the floor cutting his foot. Since the incident he has had pain to the bottom of his foot that is worsen with ambulation. He has not taken any medications for his Sx. Pt denies fever, chills, any other injuries, nausea, vomiting, diarrhea, abdominal pain, CP, SOB. There are no other acute medical concerns at this time. PCP: Nelly Potts NP  Note written by maxi Hawkins, as dictated by Dylon Tabares MD 9:16 AM        The history is provided by the patient. Past Medical History:   Diagnosis Date    Bell's palsy 02/02/2017    BPH (benign prostatic hyperplasia)     Diabetes mellitus type 2, controlled ([address]     Erectile dysfunction     with premature ejaculation    Hyperlipidemia        History reviewed. No pertinent surgical history. Family History:   Problem Relation Age of Onset    No Known Problems Mother     No Known Problems Father        Social History     Social History    Marital status:      Spouse name: N/A    Number of children: N/A    Years of education: N/A     Occupational History    Not on file. Social History Main Topics    Smoking status: Never Smoker    Smokeless tobacco: Never Used    Alcohol use No    Drug use: No    Sexual activity: Not on file     Other Topics Concern    Not on file     Social History Narrative    10/2015: Finished high school in [place], He is working as a , used to work as a  in [place]. Lives with wife and son. ALLERGIES: Review of patient's allergies indicates no known allergies. Review of Systems   Constitutional: Negative for chills, diaphoresis, fatigue and fever.    HENT: Negative for congestion, rhinorrhea, sinus pressure, sore throat, trouble swallowing and voice change. Eyes: Negative for photophobia and visual disturbance. Respiratory: Negative for cough, chest tightness and shortness of breath. Cardiovascular: Negative for chest pain, palpitations and leg swelling. Gastrointestinal: Negative for abdominal pain, blood in stool, constipation, diarrhea, nausea and vomiting. Musculoskeletal: Positive for myalgias. Negative for arthralgias and neck pain. Skin: Positive for wound. Neurological: Negative for dizziness, weakness, light-headedness, numbness and headaches. All other systems reviewed and are negative. Vitals:    06/05/17 0726   BP: 152/80   Pulse: 79   Resp: 15   Temp: 97.6 °F (36.4 °C)   SpO2: 96%            Physical Exam   Constitutional: He is oriented to person, place, and time. He appears well-developed and well-nourished. No distress. HENT:   Head: Normocephalic and atraumatic. Nose: Nose normal.   Mouth/Throat: Oropharynx is clear and moist. No oropharyngeal exudate. Eyes: Conjunctivae and EOM are normal. Right eye exhibits no discharge. Left eye exhibits no discharge. No scleral icterus. Neck: Normal range of motion. Neck supple. No JVD present. No tracheal deviation present. No thyromegaly present. Cardiovascular: Normal rate, regular rhythm, normal heart sounds and intact distal pulses. Exam reveals no gallop and no friction rub. No murmur heard. Pulmonary/Chest: Effort normal and breath sounds normal. No stridor. No respiratory distress. He has no wheezes. He has no rales. He exhibits no tenderness. Abdominal: Bowel sounds are normal. He exhibits no distension and no mass. There is no tenderness. There is no rebound. Musculoskeletal: Normal range of motion. He exhibits no edema or tenderness. Lymphadenopathy:     He has no cervical adenopathy. Neurological: He is alert and oriented to person, place, and time. No cranial nerve deficit. Coordination normal.   Skin: Skin is warm and dry. No rash noted. He is not diaphoretic. No erythema. No pallor. 2 cm evulsion to the lateral mid plantar surface of the right foot with overlying skin flap, wound is hemostatic. DP and PT pulses intact, neurovascularly intact distally. Psychiatric: He has a normal mood and affect. His behavior is normal. Judgment and thought content normal.    Note written by Marylene May, scribe, as dictated by Elvia Jacob MD 9:20 AM      MDM  Number of Diagnoses or Management Options  Foot laceration, right, initial encounter:   Risk of Complications, Morbidity, and/or Mortality  Presenting problems: low  Diagnostic procedures: low  Management options: low      ED Course       Procedures    10:45 PM  The patient has been reevaluated. The patient is ready for discharge. The patient's signs, symptoms, diagnosis, and discharge instructions have been discussed and the patient/ family has conveyed their understanding. The patient is to follow up as recommended or return to the ED should their symptoms worsen. Plan has been discussed and the patient is in agreement. LABORATORY TESTS:  No results found for this or any previous visit (from the past 12 hour(s)). IMAGING RESULTS:  No orders to display     No results found. MEDICATIONS GIVEN:  Medications   diph,Pertuss(AC),Tet Vac-PF (BOOSTRIX) suspension 0.5 mL (0.5 mL IntraMUSCular Given 6/5/17 0926)       IMPRESSION:  1. Foot laceration, right, initial encounter        PLAN:  1. Discharge Medication List as of 6/5/2017  9:36 AM      START taking these medications    Details   cephALEXin (KEFLEX) 500 mg capsule Take 1 Cap by mouth four (4) times daily for 7 days. , Print, Disp-28 Cap, R-0         CONTINUE these medications which have NOT CHANGED    Details   dextromethorphan (DELSYM) 30 mg/5 mL liquid Take 60 mg by mouth two (2) times a day., Historical Med      acetaminophen (TYLENOL) 325 mg tablet Take  by mouth every four (4) hours as needed for Pain., Historical Med      chlorpheniramine-HYDROcodone (TUSSIONEX) 10-8 mg/5 mL suspension Take 5 mL by mouth every twelve (12) hours as needed for Cough. Max Daily Amount: 10 mL. Indications: COUGH, Nasal Congestion, Print, Disp-115 mL, R-0      acyclovir (ZOVIRAX) 400 mg tablet TAKE 1 TABLET BY MOUTH 5 TIMES DAILY FOR 7 DAYS, Normal, Disp-35 Tab, R-0      rosuvastatin (CRESTOR) 10 mg tablet Take 1 Tab by mouth nightly. For cholesterol, Normal, Disp-30 Tab, R-5      metFORMIN (GLUCOPHAGE) 500 mg tablet Take 1 Tab by mouth two (2) times daily (with meals). , Normal, Disp-60 Tab, R-5      tamsulosin (FLOMAX) 0.4 mg capsule Take 1 Cap by mouth daily. For prostate, Normal, Disp-30 Cap, R-5      Blood-Glucose Meter misc 1 Device by Does Not Apply route daily for 360 doses. Check sugar daily , dx 250.00, Normal, Disp-1 Each, R-0      glucose blood VI test strips (BLOOD GLUCOSE TEST) strip daily, Normal, Disp-100 Strip, R-2      Lancets misc daily, Normal, Disp-100 Each, R-2      ibuprofen (MOTRIN) 600 mg tablet Take 1 Tab by mouth every six (6) hours as needed for Pain., Normal, Disp-40 Tab, R-0      miscellaneous medical supply misc Penile Suction device for Erectile dysfunction. , Print, Disp-1 Each, R-0           2.    Follow-up Information     Follow up With Details Comments Stephanie Siu NP  If symptoms worsen [address]  [phone]      [facility] [place] EMERGENCY [facility] [address]  [phone]            Return to ED for new or worsening symptoms       Sergei Alford MD

## 2017-06-05 NOTE — ED NOTES
Right plantar foot lac cleansed with wound cleanser and wrapped with 4x4, gauze and ace. Pt ambulatory out of ED with discharge instructions and prescriptions in hand given by Dr. Ronel Alcantara; pt verbalized understanding of discharge paperwork and time allotted for questions. VSS. Pt alert and oriented.

## 2017-06-05 NOTE — ED TRIAGE NOTES
Patient presents to the emergency department reporting a laceration to the right side of the right plantar surface of this foot. Patient states he was standing on the couch to fix his curtains, when his foot slipped and became caught in the metal parts. Patient does not believe he has had a tetanus shot in the last five years.

## 2017-06-05 NOTE — DISCHARGE INSTRUCTIONS
We hope that we have addressed all of your medical concerns. The examination and treatment you received in the Emergency Department were for an emergent problem and were not intended as complete care. It is important that you follow up with your healthcare provider(s) for ongoing care. If your symptoms worsen or do not improve as expected, and you are unable to reach your usual health care provider(s), you should return to the Emergency Department. Today's healthcare is undergoing tremendous change, and patient satisfaction surveys are one of the many tools to assess the quality of medical care. You may receive a survey from the [facility] [employer] regarding your experience in the Emergency Department. I hope that your experience has been completely positive, particularly the medical care that I provided. As such, please participate in the survey; anything less than excellent does not meet my expectations or intentions. [facility] and [address] [place] [address] participate in nationally recognized quality of care measures. If your blood pressure is greater than 120/80, as reported below, we urge that you seek medical care to address the potential of high blood pressure, commonly known as hypertension. Hypertension can be hereditary or can be caused by certain medical conditions, pain, stress, or \"white coat syndrome. \"       Please make an appointment with your health care provider(s) for follow up of your Emergency Department visit. VITALS:   Patient Vitals for the past 8 hrs:   Temp Pulse Resp BP SpO2   06/05/17 0726 97.6 °F (36.4 °C) 79 15 152/80 96 %          Thank you for allowing us to provide you with medical care today. We realize that you have many choices for your emergency care needs. Please choose us in the future for any continued health care needs. Clemencia Jasmine [place], [address] [place] [address] [address]   Office: [phone]            No results found for this or any previous visit (from the past 24 hour(s)). No results found. [place]: Instrucciones de cuidado - [ Cuts: Care Instructions ]  Instrucciones de cuidado  Un vikki puede ocurrir en cualquier parte del cuerpo. A veces, se usan puntos de sutura, grapas, [employer] para la piel o cintas Jaama 45 llamadas Steri-Strips para mantener juntos los bordes de un vikki y ayudar a que sane. Las cintas Steri-Strip pueden usarse por sí solas o junto con puntos de sutura o grapas. Otras veces, se chung los vallejo abiertos. Si el vikki es profundo y [employer], es posible que el médico haya cerrado el vikki en dos capas. Andrew capa más profunda de puntos de sutura junta la parte profunda del vikki. Estos puntos se disuelven y no es necesario extraerlos. El cierre de la capa superior, que puede [place] por medio de Dain uriostegui, Steri-Strips o Help[employer] Alliance, es lo que se puede odessa sobre el vikki. Con frecuencia, un vikki se cubre con andrew venda. El médico lo ha examinado minuciosamente, rudy pueden presentarse problemas más tarde. Si nota algún problema o nuevos síntomas, busque tratamiento médico de inmediato. La atención de seguimiento es andrew parte clave de persaud tratamiento y seguridad. Asegúrese de hacer y acudir a todas las citas, y llame a persaud médico si está teniendo problemas. También es andrew buena idea saber los resultados de silverio exámenes y mantener andrew lista de los medicamentos que sami. ¿Cómo puede cuidarse en el hogar? Si un vikki está abierto o cerrado  · Apoye la nancy afectada sobre andrew almohada en cualquier momento que esté sentado o acostado chao los 3 días siguientes. Trate de mantenerla por encima del nivel del corazón. [place] ayudará a reducir la hinchazón. · Mantenga la herida seca chao las primeras 24 a 48 horas. Después de [address] [facility], puede ducharse si el médico lo [facility]. Seque el vikki con todiandra suwoo de toalla.   · No remoje el vikki, clement en andrew angel (bañera). Persaud médico le indicará cuándo es seguro mojar el vikki. · Después de las primeras 24 a 48 horas, limpie el vikki con agua y jabón 2 veces al día, a menos que el médico le dé indicaciones diferentes. ¨ No use peróxido de hidrógeno (agua [place]) ni alcohol, los cuales pueden retrasar la sanación. ¨ Puede cubrir el vikki con andrew capa delgada de vaselina y andrew venda antiadherente. ¨ Si el médico colocó andrew venda sobre el vikki, colóquese andrew venda nueva después de limpiar el vikki o si la venda se moja o se ensucia. · Evite cualquier actividad que pudiera hacer que el vikki se umberto de nuevo. · Sea shantal con los medicamentos. Shira y siga todas las indicaciones de la [place]. ¨ Si el médico le recetó un analgésico (medicamento para el dolor), tómelo según las indicaciones. ¨ Si no está tomando un analgésico recetado, pregúntele a persaud médico si puede destiny naeem de [employer]. Si se cerró el vikki con puntos, grapas o Steri-Strips  · Siga las instrucciones anteriores para los vallejo abiertos o cerrados. · No se quite los puntos o las grapas por sí mismo. El médico le indicará cuándo debe volver para que le quiten los [address] grapas. · Deje puestas las Steri-Strips hasta que se desprendan por sí solas. Si se cerró el vikki con un adhesivo para la piel  · Siga las instrucciones anteriores para los vallejo abiertos o cerrados. · Déjese puesto el [employer] sobre la piel hasta que se desprenda por sí solo. [place] puede tardar entre 5 y 7 días. · No se rasque, frote ni hurgue el [employer]. · No se aplique la parte pegajosa de andrew venda directamente sobre el [employer]. · No se aplique ningún tipo de pomada, crema o loción sobre la nancy. [place] puede hacer que el [employer] se desprenda demasiado pronto. No use peróxido de hidrógeno (agua [place]) ni alcohol, los cuales pueden retrasar la sanación. ¿Cuándo debe pedir ayuda?   Llame a persaud médico ahora mismo o busque atención [address] inmediata si:  · Tiene dolor nuevo, o el dolor empeora. · La piel cerca del vikki está fría o pálida, o cambia de color. · Siente hormigueo, debilitamiento o entumecimiento cerca del vikki. · El vikki comienza a sangrar, y la ciera empapa la venda. Es normal que salga andrew pequeña cantidad de [place]. · Tiene dificultades para  la nancy cercana al vikki. · Tiene síntomas de infección, tales clement:  ¨ Aumento del dolor, la hinchazón, la temperatura o el enrojecimiento alrededor del vikki. ¨ Vetas rojizas que comienzan en el vikki. ¨ Pus que sale del vikki. [place]. Vigile de cerca los cambios en persaud dudley y asegúrese de comunicarse con persaud médico si:  · El vikki vuelve a abrirse. · No mejora clement se esperaba. ¿Dónde puede encontrar más información en inglés? Edgar Jointer a http://jayda-reji.info/. Manohar Heman M735 en la búsqueda para aprender más acerca de \"[place]: Instrucciones de cuidado - [ Cuts: Care Instructions ]. \"  Revisado: 27 [place], 2016  Versión del contenido: 11.2  © [id number] Healthwise, Incorporated. Las instrucciones de cuidado fueron adaptadas bajo licencia por Good [employer] (which disclaims liability or warranty for this information). Si usted tiene [place] afección médica o sobre estas instrucciones, siempre pregunte a persaud profesional de dudley. Healthwise, Incorporated niega toda garantía o responsabilidad por persaud uso de esta información.

## 2017-06-06 ENCOUNTER — PATIENT OUTREACH (OUTPATIENT)
Dept: FAMILY MEDICINE CLINIC | Age: 67
End: 2017-06-06

## 2017-06-06 NOTE — PROGRESS NOTES
NNTOC-ED    Patient listed on discharge [facility]) report dated 6/5/2017. Patient discharged from [address] [place] [address] for right foot laceration. Attempted to contact patient to perform post ED/UC discharge assessment. Unable to reach patient. This writer left message on voicemail with direct contact information. Will attempt to contact again. Need to complete post-discharge assessment. Patient has the following appointment(s) scheduled. No future appointments.

## 2017-07-18 ENCOUNTER — PATIENT OUTREACH (OUTPATIENT)
Dept: FAMILY MEDICINE CLINIC | Age: 67
End: 2017-07-18

## 2017-07-18 NOTE — PROGRESS NOTES
[facility] [address] ED f/u note    Transitions of care complete. Per EMR review, there have been no further ED/UC or hospital admissions within 30 days post discharge. Patient has not attended or scheduled any follow-up appointments. There has been no outreach from patient to this writer as requested. This writer attempted to contact patient to follow up on ED visit right foot pain on 6/5/2017. Reached patient, however patient states that he is having difficulty understanding the writer and will call this writer back. Patient's last PCP visit was 2/7/17, and per EMR patient was to f/u on 6/7/17. That appointment was not attended. Current episode will be resolved.

## 2017-07-18 NOTE — Clinical Note
PADDY: this patient may be a disease management patient. He's a DM who has not followed-up as instructed. LOV 2/7/17, did not attend 6/7/17 OV and was not too willing to speak with me today regarding his ED visit. (did not answer the phone with the initial call on 6/6 and did not return call)  I'm resolving my episode, but wanted you to be aware of the patient.

## 2017-11-29 ENCOUNTER — OFFICE VISIT (OUTPATIENT)
Dept: FAMILY MEDICINE CLINIC | Age: 67
End: 2017-11-29

## 2017-11-29 VITALS
WEIGHT: 170 LBS | HEIGHT: 65 IN | HEART RATE: 77 BPM | OXYGEN SATURATION: 97 % | SYSTOLIC BLOOD PRESSURE: 146 MMHG | DIASTOLIC BLOOD PRESSURE: 72 MMHG | TEMPERATURE: 97.7 F | RESPIRATION RATE: 16 BRPM | BODY MASS INDEX: 28.32 KG/M2

## 2017-11-29 DIAGNOSIS — E78.5 DYSLIPIDEMIA: ICD-10-CM

## 2017-11-29 DIAGNOSIS — N40.1 BENIGN NON-NODULAR PROSTATIC HYPERPLASIA WITH LOWER URINARY TRACT SYMPTOMS: ICD-10-CM

## 2017-11-29 DIAGNOSIS — R35.1 NOCTURIA: ICD-10-CM

## 2017-11-29 RX ORDER — TAMSULOSIN HYDROCHLORIDE 0.4 MG/1
0.8 CAPSULE ORAL DAILY
Qty: 60 CAP | Refills: 5 | Status: SHIPPED | OUTPATIENT
Start: 2017-11-29 | End: 2018-06-14 | Stop reason: SDUPTHER

## 2017-11-29 NOTE — MR AVS SNAPSHOT
Visit Information Mart Smith Personal Médico Departamento Teléfono del Dep. Número de visita  
 11/29/2017  9:30 AM Leticia Tong NP [facility] [phone] Follow-up Instructions Return in about 6 months (around 5/29/2018) for diabetes. Upcoming Health Maintenance Date Due  
 FOOT EXAM Q1 2/8/1961 COLONOSCOPY 2/8/1969 ZOSTER VACCINE AGE 60> 12/8/2010 Pneumococcal 65+ Low/Medium Risk (1 of 2 - PCV13) 2/8/2016 HEMOGLOBIN A1C Q6M 8/7/2017 EYE EXAM RETINAL OR DILATED Q1 12/14/2017 MICROALBUMIN Q1 2/7/2018 LIPID PANEL Q1 2/7/2018 MEDICARE YEARLY EXAM 2/8/2018 GLAUCOMA SCREENING Q2Y 12/14/2018 DTaP/Tdap/Td series (2 - Td) 6/5/2027 [place]  Review Complete El: 11/29/2017 Por: Leticia Tong NP  
 A partir del:  11/29/2017 No Known Allergies Vacunas actuales [id number] el:  12/14/2015 Kiara See Influenza Vaccine (Quad) PF 10/27/2015 Tdap 6/5/2017  9:26 AM, 12/14/2015 No revisadas esta visita You Were Diagnosed With   
  
 [address] Knife Uncontrolled type 2 diabetes mellitus without complication, without long-term current use of insulin ([facility] [address]    -  Primary ICD-10-CM: E11.65 ICD-9-CM: 250.02 Benign non-nodular prostatic hyperplasia with lower urinary tract symptoms     ICD-10-CM: N40.1 ICD-9-CM: 600.91 Dyslipidemia     ICD-10-CM: E78.5 ICD-9-CM: 272.4 Nocturia     ICD-10-CM: R35.1 ICD-9-CM: 788.43 Partes vitales PS Pulso Temperatura Resp [place] ( percentil de crecimiento) Peso (percentil de crecimiento) 146/72 (BP 1 Location: Right arm, BP Patient Position: Sitting) 77 97.7 °F (36.5 °C) (Oral) 16 5' 5\" (1.651 m) 170 lb (77.1 kg) SpO2 BMI ([facility]) Estatus de tabaquísmo 97% 28.29 kg/m2 Never Smoker Historial de signos vitales BMI and BSA Data  Body Mass Index Body Surface Area  
 28.29 kg/m 2 1.88 m 2  
  
  
 Preferred Pharmacy Pharmacy Name Phone [facility] [address] [facility] [phone] Cleary lista de medicamentos actualizada Lista actualizada el: 11/29/17  9:53 AM.  Chato Donahue use cleary lista de medicamentos más reciente. acyclovir 400 mg tablet También conocido clement:  ZOVIRAX TAKE 1 TABLET BY MOUTH 5 TIMES DAILY FOR 7 DAYS  
  
 glucose blood VI test strips strip También conocido clement:  blood glucose test  
daily  
  
 ibuprofen 600 mg tablet También conocido clement:  MOTRIN Take 1 Tab by mouth every six (6) hours as needed for Pain. Lancets Misc  
daily  
  
 metFORMIN 500 mg tablet También conocido clement:  GLUCOPHAGE Take 1 Tab by mouth two (2) times daily (with meals). miscellaneous medical supply Misc Penile Suction device for Erectile dysfunction. rosuvastatin 10 mg tablet También conocido clement:  [place]re Take 1 Tab by mouth nightly. For cholesterol  
  
 tamsulosin 0.4 mg capsule También conocido clement:  FLOMAX Take 2 Caps by mouth daily. For prostate TYLENOL 325 mg tablet Medicamento genérico:  acetaminophen Take  by mouth every four (4) hours as needed for Pain. Prescriptions Sent to Pharmacy Refills  
 tamsulosin (FLOMAX) 0.4 mg capsule 5 Sig: Take 2 Caps by mouth daily. For prostate Class: Normal  
 Pharmacy: [facility] [place] [address], [address] [facility] Ph #: [phone] Route: Oral  
  
Hicimos lo siguiente HEMOGLOBIN A1C W/O EAG [72070 CPT(R)]  DIABETES FOOT EXAM [7 Custom] LIPID PANEL [96311 CPT(R)] METABOLIC PANEL, COMPREHENSIVE [34410 CPT(R)] PSA W/ REFLX FREE PSA [99173 CPT(R)] Instrucciones de seguimiento Return in about 6 months (around 5/29/2018) for diabetes. Instrucciones para el Paciente Aprenda acerca de las pautas alimentarias para diabetes - [ Learning About Diabetes Food Guidelines ] Instrucciones de cuidado La planificación de las comidas es importante para el manejo de la diabetes. Ayuda a mantener el azúcar en la ciera en el nivel ideal (que usted fija con persaud médico). Usted no tiene que comer alimentos especiales. Puede comer lo mismo que persaud jd, incluso dulces de vez en cuando. [place] debe prestar atención a la cantidad y la frecuencia con que come ciertos alimentos. Roc vez desee colaborar con un dietista o educador de diabetes certificado (CDE, por silverio siglas en inglés) para que le ayuden a planificar las comidas y los refrigerios. Un dietista o CDE también puede ayudarle a bajar de peso si adam es naeem de silverio objetivos. Helga Do saber acerca de ingerir carbohidratos? Manejar la cantidad de carbohidratos que ingiere es andrew parte importante de la alimentación saludable cuando tiene diabetes. Los carbohidratos se encuentran en muchos alimentos. · Sepa qué alimentos contienen carbohidratos. Y aprenda qué cantidad de carbohidratos contienen los diferentes alimentos. ¨ El pan, los cereales, la pasta y el arroz tienen aproximadamente 15 gramos de carbohidratos por porción. Andrew porción equivale a 1 rebanada de pan (1 onza o 28 g), ½ taza de cereal cocido o 1/3 de taza de pasta o arroz cocidos. ¨ Las frutas tienen 15 gramos de carbohidratos por porción. Mouna Artemio porción es 1 fruta fresca pequeña, clement andrew [place] medhat o andrew naranja; ½ banana (plátano); ½ taza de fruta cocida o enlatada; ½ taza de jugo de fruta; 1 taza de melón o frambuesas; o 2 cucharadas de frutas secas. ¨ La leche y el yogur sin azúcar agregado tienen 15 gramos de carbohidratos por porción. Mouna Artemio porción es 1 taza de [place] o 2/3 taza de yogur sin azúcar agregado. ¨ Las verduras con almidón tienen 15 gramos de carbohidratos por porción.  Andrew porción es ½ taza de puré de papa o camote (batata, boniato); 1 taza de calabacín; ½ papa horneada pequeña; ½ taza de frijoles cocidos; o ½ taza de maíz (elote) o arvejas (chícharos) cocidos. · Aprenda cuántos carbohidratos debe consumir cada día y en cada comida. Un dietista o CDE le puede enseñar cómo llevar la cuenta de los carbohidratos que consume. A esto se le llama recuento de carbohidratos. · Si no está seguro de cómo [employer] gramos de carbohidratos, utilice el Método del [place] para planificar las comidas. Es andrew [place] y rápida de asegurarse de que consuma comidas equilibradas. También le ayuda a distribuir los carbohidratos chao el día. ¨ Divida el plato por tipo de alimento. Llene medio plato con verduras sin almidón, ponga carne u otras proteínas en andrew cuarta parte del plato y granos o verduras con almidón en el último cuarto del plato. A esto puede agregarle un pequeño pedazo de fruta y 3 taza de [place] o yogur, según la cantidad de carbohidratos que deba consumir en andrew comida. · Trate de comer aproximadamente la misma cantidad de carbohidratos en cada comida. No \"reserve\" persaud cantidad diaria de carbohidratos para consumirlos en andrew valerie comida. · Las proteínas contienen muy pocos o nada de carbohidratos por porción. Los ejemplos de proteínas incluyen carne de res, [place] bruno, [place], [place], [place], tofu, Calrie-barre, requesón (\"cottage cheese\") y la mantequilla de cacahuate [place]). Andrew porción de carne son 3 onzas (85 g), lo cual es aproximadamente del tamaño de andrew baraja de naipes. Los ejemplos de porciones de sustitutos de la carne (equivalente a 1 onza o 28 g de carne) son 1/4 de taza de requesón, 1 huevo, 1 cucharada de [place] de cacahuate y ½ taza de tofu. Cómo puede comer fuera y aún así comer de modo saludable? · Aprenda a calcular los tamaños de las porciones de alimentos que contienen carbohidratos. Si mide la comida en casa, será más fácil calcular la cantidad en andrew porción de comida de restaurante.  
· Si el platillo que pide contiene demasiados carbohidratos (clement justin, maíz o frijoles al horno), pida un alimento bajo en carbohidratos en cleary lugar. Pida andrew ensalada o verduras. · Si [place] insulina, revise cleary azúcar en la ciera antes y después de comer fuera para ayudarle a planear cuánto comer en el futuro. · Si usted come más carbohidratos de lo planeado en andrew comida, dé un paseo o bryce otro tipo de ejercicio. [place] ayudará a reducir el azúcar en la ciera. Qué más debería saber? · Limite las grasas saturadas, clement la grasa de la carne y productos lácteos. Esta es andrew opción saludable, porque las personas que tienen diabetes tienen un mayor riesgo de enfermedades del corazón. Así que elija vallejo magros de carne y productos lácteos descremados o semidescremados. Utilice aceite de banda o de canola en lugar de mantequilla o manteca al cocinar. · No se salte comidas. Cleary nivel de azúcar en la ciera puede bajar demasiado si usted se salta comidas y sami insulina o ciertos medicamentos para la diabetes. · Consulte con cleary médico antes de beber alcohol. El alcohol puede hacer que cleary azúcar en la ciera baje demasiado. El alcohol también puede causar andrew reacción adversa si usted sami ciertos medicamentos para la diabetes. La atención de seguimiento es andrew parte clave de cleary tratamiento y seguridad. Asegúrese de hacer y acudir a todas las citas, y llame a cleary médico si está teniendo problemas. También es andrew buena idea saber los resultados de los exámenes y mantener andrew lista de los medicamentos que sami. Dónde puede encontrar más información en inglés? [place] a http://jayda-reji.info/. Haven Anger V819 en la búsqueda para aprender más acerca de \"Aprenda acerca de las pautas alimentarias para diabetes - [ Learning About Diabetes Food Guidelines ]. \" 
Revisado: 13 marzo, 2017 Versión del contenido: 11.4 © 9561-6750 Healthwise, [employer].  Las instrucciones de cuidado fueron adaptadas bajo licencia por Good Help Connections (which disclaims liability or warranty for this information). Si usted tiene [place] afección médica o sobre estas instrucciones, siempre pregunte a persaud profesional de dudley. [employer] niega toda garantía o responsabilidad por persaud uso de esta información. Introducing \[facility]\"" & HEALTH SERVICES! Agatha Mcneil introduces [employer] patient portal. Now you can access parts of your medical record, email your doctor's office, and request medication refills online. 1. In your internet browser, go to [email or web address]. [employer] 2. Click on the First Time User? Click Here link in the Sign In box. You will see the New Member Sign Up page. 3. Enter your [employer] Access Code exactly as it appears below. You will not need to use this code after youve completed the sign-up process. If you do not sign up before the expiration date, you must request a new code. · [employer] Access Code: [email or web address] Expires: 2/27/2018  9:53 AM 
 
4. Enter the last four digits of your Social Security Number (xxxx) and Date of Birth (mm/dd/yyyy) as indicated and click Submit. You will be taken to the next sign-up page. 5. Create a [employer]as ID. This will be your [employer] login ID and cannot be changed, so think of one that is secure and easy to remember. 6. Create a [employer] password. You can change your password at any time. 7. Enter your Password Reset Question and Answer. This can be used at a later time if you forget your password. 8. Enter your e-mail address. You will receive e-mail notification when new information is available in [address] 9. Click Sign Up. You can now view and download portions of your medical record. 10. Click the Download Summary menu link to download a portable copy of your medical information. If you have questions, please visit the Frequently Asked Questions section of the [employer] website. Remember, [employer] is NOT to be used for urgent needs. For medical emergencies, dial 911. Now available from your iPhone and Android! Please provide this summary of care documentation to your next provider. Your primary care clinician is listed as Stephanie Avery. If you have any questions after today's visit, please call [phone].

## 2017-11-29 NOTE — PATIENT INSTRUCTIONS
Aprenda acerca de las pautas alimentarias para diabetes - [ Learning About Diabetes Food Guidelines ]  Instrucciones de cuidado    La planificación de las comidas es importante para el manejo de la diabetes. Ayuda a mantener el azúcar en la ciera en el nivel ideal (que usted fija con persaud médico). Usted no tiene que comer alimentos especiales. Puede comer lo mismo que persaud jd, incluso dulces de vez en cuando. [place] debe prestar atención a la cantidad y la frecuencia con que come ciertos alimentos. Roc vez desee colaborar con un dietista o educador de diabetes certificado (CDE, por silverio siglas en inglés) para que le ayuden a planificar las comidas y los refrigerios. Un dietista o CDE también puede ayudarle a bajar de peso si adam es naeem de silverio objetivos. Verlene Duran saber acerca de ingerir carbohidratos? Manejar la cantidad de carbohidratos que ingiere es andrew parte importante de la alimentación saludable cuando tiene diabetes. Los carbohidratos se encuentran en muchos alimentos. · Sepa qué alimentos contienen carbohidratos. Y aprenda qué cantidad de carbohidratos contienen los diferentes alimentos. ¨ El pan, los cereales, la pasta y el arroz tienen aproximadamente 15 gramos de carbohidratos por porción. Andrew porción equivale a 1 rebanada de pan (1 onza o 28 g), ½ taza de cereal cocido o 1/3 de taza de pasta o arroz cocidos. ¨ Las frutas tienen 15 gramos de carbohidratos por porción. [place] porción es 1 fruta fresca pequeña, clement andrew [place] med[place] o andrew naranja; ½ banana (plátano); ½ taza de fruta cocida o enlatada; ½ taza de jugo de fruta; 1 taza de melón o frambuesas; o 2 cucharadas de frutas secas. ¨ La leche y el yogur sin azúcar agregado tienen 15 gramos de carbohidratos por porción. [place] porción es 1 taza de [place] o 2/3 taza de yogur sin azúcar agregado. ¨ Las verduras con almidón tienen 15 gramos de carbohidratos por porción.  [place] porción es ½ taza de puré de papa o camote (batata, boniato); 1 taza de calabacín; ½ papa horneada pequeña; ½ taza de frijoles cocidos; o ½ taza de maíz (elote) o arvejas (chícharos) cocidos. · Aprenda cuántos carbohidratos debe consumir cada día y en cada comida. Un dietista o CDE le puede enseñar cómo llevar la cuenta de los carbohidratos que consume. A esto se le llama recuento de carbohidratos. · Si no está seguro de cómo [employer] gramos de carbohidratos, utilice el [employer] [place] para planificar las comidas. Es andrew [place] y rápida de asegurarse de que consuma comidas equilibradas. También le ayuda a distribuir los carbohidratos chao el día. ¨ Divida el plato por tipo de alimento. Llene medio plato con verduras sin almidón, ponga carne u otras proteínas en andrew cuarta parte del plato y granos o verduras con almidón en el último cuarto del plato. A esto puede agregarle un pequeño pedazo de fruta y 3 taza de [place] o yogur, según la cantidad de carbohidratos que deba consumir en andrew comida. · Trate de comer aproximadamente la misma cantidad de carbohidratos en cada comida. No \"reserve\" persaud cantidad diaria de carbohidratos para consumirlos en andrew valerie comida. · Las proteínas contienen muy pocos o nada de carbohidratos por porción. Los ejemplos de proteínas incluyen carne de res, [place], [place], [place], [place], tofu, Carlie-barre, requesón (\"cottage cheese\") y la mantequilla de cacahuate [place]). Andrew porción de carne son 3 onzas (85 g), lo cual es aproximadamente del tamaño de andrew baraja de naipes. Los ejemplos de porciones de sustitutos de la carne (equivalente a 1 onza o 28 g de carne) son 1/4 de taza de requesón, 1 huevo, 1 cucharada de [place] de cacahuate y ½ taza de tofu. Cómo puede comer fuera y aún así comer de modo saludable? · Aprenda a calcular los tamaños de las porciones de alimentos que contienen carbohidratos. Si mide la comida en casa, será más fácil calcular la cantidad en andrew porción de comida de restaurante.   · Si el platillo que pide contiene demasiados carbohidratos (clement justin, maíz o frijoles al horno), pida un alimento bajo en carbohidratos en cleary lugar. Pida andrew ensalada o verduras. · Si [place] insulina, revise cleary azúcar en la ciera antes y después de comer fuera para ayudarle a planear cuánto comer en el futuro. · Si usted come más carbohidratos de lo planeado en andrew comida, dé un paseo o bryce otro tipo de ejercicio. [place] ayudará a reducir el azúcar en la ciera. Qué más debería saber? · Limite las grasas saturadas, clement la grasa de la carne y productos lácteos. Esta es andrew opción saludable, porque las personas que tienen diabetes tienen un mayor riesgo de enfermedades del corazón. Así que elija vallejo magros de carne y productos lácteos descremados o semidescremados. Utilice aceite de banda o de canola en lugar de mantequilla o manteca al cocinar. · No se salte comidas. Cleary nivel de azúcar en la ciera puede bajar demasiado si usted se salta comidas y sami insulina o ciertos medicamentos para la diabetes. · Consulte con cleary médico antes de beber alcohol. El alcohol puede hacer que cleary azúcar en la ciera baje demasiado. El alcohol también puede causar andrew reacción adversa si usted sami ciertos medicamentos para la diabetes. La atención de seguimiento es andrew parte clave de cleray tratamiento y seguridad. Asegúrese de hacer y acudir a todas las citas, y llame a cleary médico si está teniendo problemas. También es andrew buena idea saber los resultados de los exámenes y mantener andrew lista de los medicamentos que sami. Dónde puede encontrar más información en inglés? Anastasiya Robles a http://jayda-reji.info/. Mario Cummings K306 en la búsqueda para aprender más acerca de \"Aprenda acerca de las pautas alimentarias para diabetes - [ Learning About Diabetes Food Guidelines ]. \"  Revisado: 13 marzo, 2017  Versión del contenido: 11.4  © 2323-7231 Healthwise, [employer].  Las instrucciones de cuidado fueron adaptadas bajo licencia por [employer] (which disclaims liability or warranty for this information). Si usted tiene [place] afección médica o sobre estas instrucciones, siempre pregunte a persaud profesional de dudley. [facility] niega toda garantía o responsabilidad por persaud uso de esta información.

## 2017-11-29 NOTE — PROGRESS NOTES
Chief Complaint   Patient presents with    Diabetes     follow up     1. Have you been to the ER, urgent care clinic since your last visit? Hospitalized since your last visit? No    2. Have you seen or consulted any other health care providers outside of the [address] [place] [address] since your last visit? Include any pap smears or colon screening.  No

## 2017-11-29 NOTE — PROGRESS NOTES
Stef Arroyo is a 78 y.o. male who was seen in clinic today (11/29/2017). Subjective:  Cardiovascular Review:  The patient has diabetes, hypertension and hyperlipidemia. Diet and Lifestyle: generally follows a low fat low cholesterol diet, generally follows a low sodium diet, follows a diabetic diet regularly, exercises sporadically, nonsmoker  Home BP Monitoring: is not measured at home. Pertinent ROS: not taking medications regularly as instructed, no TIA's, no chest pain on exertion, no dyspnea on exertion, no swelling of ankles. Patient reports having a colonoscopy in 2013 in [place] with normal findings. Diabetes Mellitus:  Last Hemoglobin A1c: 7.2% (2/2017)  Diabetic ROS - medication compliance: no medication at present, diabetic diet compliance: compliant most of the time, home glucose monitoring: is performed sporadically, <150, further diabetic ROS: no polyuria or polydipsia, no chest pain, dyspnea or TIA's, no numbness, tingling or pain in extremities. BPH   Patient complains of lower urinary tract symptoms. Patient reports severe symptoms of BPH. Onset of symptoms was several months ago and was gradual in onset. He reports moderate control of symptoms with Flomax - requests dosage increase. Prior to Admission medications    Medication Sig Start Date End Date Taking? Authorizing Provider   tamsulosin (FLOMAX) 0.4 mg capsule Take 2 Caps by mouth daily. For prostate 11/29/17  Yes Nely Cornea, NP   acetaminophen (TYLENOL) 325 mg tablet Take  by mouth every four (4) hours as needed for Pain. Yes Historical Provider   acyclovir (ZOVIRAX) 400 mg tablet TAKE 1 TABLET BY MOUTH 5 TIMES DAILY FOR 7 DAYS 3/6/17  Yes Nely Cornea, NP   rosuvastatin (CRESTOR) 10 mg tablet Take 1 Tab by mouth nightly. For cholesterol 2/7/17  Yes Nely Cornea, NP   metFORMIN (GLUCOPHAGE) 500 mg tablet Take 1 Tab by mouth two (2) times daily (with meals).  2/7/17  Yes Nely Cornea, NP   glucose blood VI test strips (BLOOD GLUCOSE TEST) strip daily 9/2/16  Yes Mary Matute MD   Lancets misc daily 9/2/16  Yes Mary Matute MD   ibuprofen (MOTRIN) 600 mg tablet Take 1 Tab by mouth every six (6) hours as needed for Pain. 7/22/16  Yes Suzy Buitrago MD   miscellaneous medical supply misc Penile Suction device for Erectile dysfunction. 10/8/15  Yes General Angelina MD          No Known Allergies        ROS  See HPI    Objective:   Physical Exam   Constitutional: He is oriented to person, place, and time. He appears well-developed and well-nourished. No distress. HENT:   Right Ear: Tympanic membrane and ear canal normal.   Left Ear: Tympanic membrane and ear canal normal.   Nose: No mucosal edema. Right sinus exhibits no maxillary sinus tenderness and no frontal sinus tenderness. Left sinus exhibits no maxillary sinus tenderness and no frontal sinus tenderness. Mouth/Throat: Oropharynx is clear and moist.   Eyes: EOM are normal. Pupils are equal, round, and reactive to light. Neck: Normal range of motion. Neck supple. No JVD present. Carotid bruit is not present. No thyromegaly present. Cardiovascular: Normal rate, regular rhythm, normal heart sounds and intact distal pulses. Exam reveals no gallop and no friction rub. No murmur heard. Pulmonary/Chest: Effort normal and breath sounds normal. No respiratory distress. He has no decreased breath sounds. He has no wheezes. He has no rhonchi. Abdominal: Soft. Bowel sounds are normal. He exhibits no distension. There is no tenderness. Musculoskeletal: He exhibits no edema. Lymphadenopathy:     He has no cervical adenopathy. Neurological: He is alert and oriented to person, place, and time. Psychiatric: He has a normal mood and affect. His behavior is normal.   Nursing note and vitals reviewed. Diabetic foot exam - no evidence of ulcerations of infection, sensation intact with filament testing.  Pedal pulse palpable. Visit Vitals    /72 (BP 1 Location: Right arm, BP Patient Position: Sitting)    Pulse 77    Temp 97.7 °F (36.5 °C) (Oral)    Resp 16    Ht 5' 5\" (1.651 m)    Wt 170 lb (77.1 kg)    SpO2 97%    BMI 28.29 kg/m2       Assessment & Plan:  Diagnoses and all orders for this visit:    1. Uncontrolled type 2 diabetes mellitus without complication, without long-term current use of insulin ([address]  Reviewed diet and lifestyle changes. Will adjust medications as needed. -     HEMOGLOBIN A1C W/O EAG  -      DIABETES FOOT EXAM    2. Benign non-nodular prostatic hyperplasia with lower urinary tract symptoms  Check PSA. Referral to urology as needed. -     Increase tamsulosin (FLOMAX) 0.4 mg capsule; Take 2 Caps by mouth daily. For prostate    3. Dyslipidemia  -     METABOLIC PANEL, COMPREHENSIVE  -     LIPID PANEL    4. Nocturia  -     PSA W/ REFLX FREE PSA      I have discussed the diagnosis with the patient and the intended plan as seen in the above orders. The patient has received an after-visit summary along with patient information handout. I have discussed medication side effects and warnings with the patient as well. Follow-up Disposition:  Return in about 6 months (around 5/29/2018) for diabetes.         Estevan Doherty NP

## 2017-11-29 NOTE — LETTER
12/26/2017 2:34 PM 
 
Mr. Joel Arriola [id number] Dear Joel Garcia: 
 
Please find your most recent results below. Resulted Orders METABOLIC PANEL, COMPREHENSIVE Result Value Ref Range Glucose 199 (H) 65 - 99 mg/dL BUN 17 8 - 27 mg/dL Creatinine 1.15 0.76 - 1.27 mg/dL GFR est non-AA 66 >59 mL/min/1.73 GFR est AA 76 >59 mL/min/1.73  
 BUN/Creatinine ratio 15 10 - 24 Sodium 142 134 - 144 mmol/L Potassium 4.2 3.5 - 5.2 mmol/L Chloride 100 96 - 106 mmol/L  
 CO2 23 18 - 29 mmol/L Calcium 9.5 8.6 - 10.2 mg/dL Protein, total 7.4 6.0 - 8.5 g/dL Albumin 4.5 3.6 - 4.8 g/dL GLOBULIN, TOTAL 2.9 1.5 - 4.5 g/dL A-G Ratio 1.6 1.2 - 2.2 Bilirubin, total 0.7 0.0 - 1.2 mg/dL Alk. phosphatase 92 39 - 117 IU/L  
 AST (SGOT) 32 0 - 40 IU/L  
 ALT (SGPT) 40 0 - 44 IU/L Narrative Performed at:  [address]  [id number] : Marta Poon MD, Phone:  [phone] LIPID PANEL Result Value Ref Range Cholesterol, total 199 100 - 199 mg/dL Triglyceride 681 (HH) 0 - 149 mg/dL HDL Cholesterol 33 (L) >39 mg/dL VLDL, calculated Comment 5 - 40 mg/dL Comment:  
   The calculation for the VLDL cholesterol is not valid when 
triglyceride level is >400 mg/dL. LDL, calculated Comment 0 - 99 mg/dL Comment:  
   Triglyceride result indicated is too high for an accurate LDL 
cholesterol estimation. Narrative Performed at:  [address]  [id number] : Marta Poon MD, Phone:  [phone] HEMOGLOBIN A1C W/O EAG Result Value Ref Range Hemoglobin A1c 9.5 (H) 4.8 - 5.6 % Comment:  
            Pre-diabetes: 5.7 - 6.4 Diabetes: >6.4 Glycemic control for adults with diabetes: <7.0 Narrative Performed at:  [facility]  [address] [place] [address]  [id number] : Kuldeep Mello MD, Phone:  [phone] PSA W/ REFLX FREE PSA Result Value Ref Range Prostate Specific Ag 5.0 (H) 0.0 - 4.0 ng/mL Comment:  
   Roche ECLIA methodology. According to the American Urological Association, Serum PSA should 
decrease and remain at undetectable levels after radical 
prostatectomy. The AUA defines biochemical recurrence as an initial 
PSA value 0.2 ng/mL or greater followed by a subsequent confirmatory PSA value 0.2 ng/mL or greater. Values obtained with different assay methods or kits cannot be used 
interchangeably. Results cannot be interpreted as absolute evidence 
of the presence or absence of malignant disease. Reflex Criteria Comment Comment:  
   The percent free PSA is performed on a reflex basis only when the 
total PSA is between 4.0 and 10.0 ng/mL. Narrative Performed at:  [address]  [id number] : Kuldeep Mello MD, Phone:  [phone] PSA, FREE Result Value Ref Range PSA, Free 1.10 N/A ng/mL Comment:  
   Roche ECLIA methodology. % Free PSA 22.0 % Comment: The table below lists the probability of prostate cancer for 
men with non-suspicious HALEY results and total PSA between 4 and 10 ng/mL, by patient age Gerber Metz, [address] [place] [address], 
[id number]). % Free PSA       50-64 yr        65-75 yr 
                  0.00-10.00%        56%             55% 10.01-15.00%        24%             35% 15.01-20.00%        17%             23% 20.01-25.00%        10%             20% 
                      >25.00%         5%              9% 
Please note:  Devin et al did not make specific 
              recommendations regarding the use of 
              percent free PSA for any other population 
              of men. Narrative Performed at:  [address]  [id number] : Melody Batres MD, Phone:  [phone] CVD REPORT Result Value Ref Range INTERPRETATION Note Comment:  
   Medical Director's Note: A CV Risk Assessment Report was not 
sent because both LDL cholesterol and non-HDL cholesterol 
results are required to generate a report. Supplemental report is available. Narrative Performed at:  [address] [address] [place], [place]  [id number] : Shanda Quiroga PhD, Phone:  [phone] The test for diabetes (Hemoglobin A1c) was elevated at 9.5%, up from 7.2%. The goal for diabetes control in <7.0%. Will increase Metformin and add Glipizide. I also recommend avoiding sweets and concentrated sugars in addition to exercise and weight loss. Triglycerides were significantly elevated likely due to the uncontrolled diabetes. I recommend a low fat diet (avoiding fried and fast food, red meat, etc) as well as regular exercise. PSA level has increased from 4.8 to 5.2. Follow up with urology needed. I recommend rechecking diabetes and cholesterol in 3 months. Please call me if you have any questions: [phone] Sincerely, Rancho Khan NP

## 2017-11-30 LAB
ALBUMIN SERPL-MCNC: 4.5 G/DL (ref 3.6–4.8)
ALBUMIN/GLOB SERPL: 1.6 {RATIO} (ref 1.2–2.2)
ALP SERPL-CCNC: 92 IU/L (ref 39–117)
ALT SERPL-CCNC: 40 IU/L (ref 0–44)
AST SERPL-CCNC: 32 IU/L (ref 0–40)
BILIRUB SERPL-MCNC: 0.7 MG/DL (ref 0–1.2)
BUN SERPL-MCNC: 17 MG/DL (ref 8–27)
BUN/CREAT SERPL: 15 (ref 10–24)
CALCIUM SERPL-MCNC: 9.5 MG/DL (ref 8.6–10.2)
CHLORIDE SERPL-SCNC: 100 MMOL/L (ref 96–106)
CHOLEST SERPL-MCNC: 199 MG/DL (ref 100–199)
CO2 SERPL-SCNC: 23 MMOL/L (ref 18–29)
CREAT SERPL-MCNC: 1.15 MG/DL (ref 0.76–1.27)
GFR SERPLBLD CREATININE-BSD FMLA CKD-EPI: 66 ML/MIN/1.73
GFR SERPLBLD CREATININE-BSD FMLA CKD-EPI: 76 ML/MIN/1.73
GLOBULIN SER CALC-MCNC: 2.9 G/DL (ref 1.5–4.5)
GLUCOSE SERPL-MCNC: 199 MG/DL (ref 65–99)
HBA1C MFR BLD: 9.5 % (ref 4.8–5.6)
HDLC SERPL-MCNC: 33 MG/DL
INTERPRETATION, 910389: NORMAL
LDLC SERPL CALC-MCNC: ABNORMAL MG/DL (ref 0–99)
POTASSIUM SERPL-SCNC: 4.2 MMOL/L (ref 3.5–5.2)
PROT SERPL-MCNC: 7.4 G/DL (ref 6–8.5)
PSA FREE MFR SERPL: 22 %
PSA FREE SERPL-MCNC: 1.1 NG/ML
PSA SERPL-MCNC: 5 NG/ML (ref 0–4)
REFLEX CRITERIA: ABNORMAL
SODIUM SERPL-SCNC: 142 MMOL/L (ref 134–144)
TRIGL SERPL-MCNC: 681 MG/DL (ref 0–149)
VLDLC SERPL CALC-MCNC: ABNORMAL MG/DL (ref 5–40)

## 2017-12-04 RX ORDER — METFORMIN HYDROCHLORIDE 500 MG/1
1000 TABLET, EXTENDED RELEASE ORAL
Qty: 60 TAB | Refills: 5 | Status: SHIPPED | OUTPATIENT
Start: 2017-12-04 | End: 2018-06-14 | Stop reason: SDUPTHER

## 2017-12-04 RX ORDER — GLIPIZIDE 5 MG/1
5 TABLET ORAL 2 TIMES DAILY
Qty: 60 TAB | Refills: 5 | Status: SHIPPED | OUTPATIENT
Start: 2017-12-04 | End: 2018-06-14 | Stop reason: SDUPTHER

## 2017-12-04 NOTE — PROGRESS NOTES
The test for diabetes (Hemoglobin A1c) was elevated at 9.5%, up from 7.2%. The goal for diabetes control in <7.0%. Will increase Metformin and add Glipizide. I also recommend avoiding sweets and concentrated sugars in addition to exercise and weight loss. Triglycerides were significantly elevated likely due to the uncontrolled diabetes. I recommend a low fat diet (avoiding fried and fast food, red meat, etc) as well as regular exercise. PSA level has increased from 4.8 to 5.2. Follow up with urology needed. I recommend rechecking diabetes and cholesterol in 3 months.

## 2017-12-05 NOTE — PROGRESS NOTES
[phone] attempted to call patient no answer left message to call me back in regards to his lab results

## 2017-12-21 NOTE — PROGRESS NOTES
[phone] attempted to call patient no answer left message to call me back in regards to his labs results

## 2017-12-31 DIAGNOSIS — E78.5 DYSLIPIDEMIA: ICD-10-CM

## 2017-12-31 RX ORDER — ROSUVASTATIN CALCIUM 10 MG/1
TABLET, COATED ORAL
Qty: 30 TAB | Refills: 5 | Status: SHIPPED | OUTPATIENT
Start: 2017-12-31 | End: 2018-04-11 | Stop reason: SDUPTHER

## 2018-01-05 ENCOUNTER — TELEPHONE (OUTPATIENT)
Dept: FAMILY MEDICINE CLINIC | Age: 68
End: 2018-01-05

## 2018-01-05 NOTE — TELEPHONE ENCOUNTER
Patient needs a prior authorization for his med (rosuastain) 10 mg. Patient can be reach at [phone].  Thank you

## 2018-01-08 NOTE — TELEPHONE ENCOUNTER
Spoke with patient this am and what I could ascertain, he needs a refill and not a PA. Spoke with pharmacy tech and was informed Rx ready for , rosuvastatin and no PA on file.

## 2018-04-11 DIAGNOSIS — E78.5 DYSLIPIDEMIA: ICD-10-CM

## 2018-04-11 NOTE — TELEPHONE ENCOUNTER
Pharmacy on file verified  Last filled 12/31/17  Last seen 11/29/17  Last labs 11/29/17  Upcoming appointment 5/30/18  Pharmacy requesting 90 day supply.   .  Requested Prescriptions     Pending Prescriptions Disp Refills    rosuvastatin (CRESTOR) 10 mg tablet 30 Tab 5

## 2018-04-12 RX ORDER — ROSUVASTATIN CALCIUM 10 MG/1
TABLET, COATED ORAL
Qty: 90 TAB | Refills: 1 | Status: SHIPPED | OUTPATIENT
Start: 2018-04-12 | End: 2018-06-14 | Stop reason: SDUPTHER

## 2018-06-14 ENCOUNTER — OFFICE VISIT (OUTPATIENT)
Dept: FAMILY MEDICINE CLINIC | Age: 68
End: 2018-06-14

## 2018-06-14 VITALS
RESPIRATION RATE: 18 BRPM | WEIGHT: 169 LBS | HEIGHT: 65 IN | OXYGEN SATURATION: 96 % | BODY MASS INDEX: 28.16 KG/M2 | SYSTOLIC BLOOD PRESSURE: 150 MMHG | HEART RATE: 66 BPM | DIASTOLIC BLOOD PRESSURE: 72 MMHG | TEMPERATURE: 98.1 F

## 2018-06-14 DIAGNOSIS — Z71.89 ACP (ADVANCE CARE PLANNING): ICD-10-CM

## 2018-06-14 DIAGNOSIS — E78.5 DYSLIPIDEMIA: ICD-10-CM

## 2018-06-14 DIAGNOSIS — N40.1 BENIGN NON-NODULAR PROSTATIC HYPERPLASIA WITH LOWER URINARY TRACT SYMPTOMS: ICD-10-CM

## 2018-06-14 DIAGNOSIS — Z00.00 MEDICARE ANNUAL WELLNESS VISIT, SUBSEQUENT: ICD-10-CM

## 2018-06-14 DIAGNOSIS — Z23 ENCOUNTER FOR IMMUNIZATION: ICD-10-CM

## 2018-06-14 DIAGNOSIS — I10 ESSENTIAL HYPERTENSION: ICD-10-CM

## 2018-06-14 LAB — HBA1C MFR BLD HPLC: 7.9 %

## 2018-06-14 RX ORDER — METFORMIN HYDROCHLORIDE 500 MG/1
1000 TABLET, EXTENDED RELEASE ORAL
Qty: 180 TAB | Refills: 1 | Status: SHIPPED | OUTPATIENT
Start: 2018-06-14

## 2018-06-14 RX ORDER — TAMSULOSIN HYDROCHLORIDE 0.4 MG/1
0.8 CAPSULE ORAL DAILY
Qty: 180 CAP | Refills: 1 | Status: SHIPPED | OUTPATIENT
Start: 2018-06-14 | End: 2019-01-21 | Stop reason: SDUPTHER

## 2018-06-14 RX ORDER — LOSARTAN POTASSIUM AND HYDROCHLOROTHIAZIDE 12.5; 5 MG/1; MG/1
1 TABLET ORAL DAILY
Qty: 90 TAB | Refills: 1 | Status: SHIPPED | OUTPATIENT
Start: 2018-06-14

## 2018-06-14 RX ORDER — GLIPIZIDE 5 MG/1
5 TABLET ORAL 2 TIMES DAILY
Qty: 180 TAB | Refills: 1 | Status: SHIPPED | OUTPATIENT
Start: 2018-06-14 | End: 2018-06-14 | Stop reason: ALTCHOICE

## 2018-06-14 RX ORDER — ROSUVASTATIN CALCIUM 10 MG/1
TABLET, COATED ORAL
Qty: 90 TAB | Refills: 1 | Status: SHIPPED | OUTPATIENT
Start: 2018-06-14

## 2018-06-14 NOTE — MR AVS SNAPSHOT
Issac Griffin 
 
 
 [facility] [address] [place] [address] 
[phone] Patient: Alvarez Moreno MRN: [id number] Visit Information Excell Yaw Moore Personal Médico Departamento Teléfono del Dep. Número de visita 6/14/2018  8:30 AM Robinson Castillo, WAYNE [address] [phone] Follow-up Instructions Return in about 4 months (around 10/14/2018). Upcoming Health Maintenance Date Due COLONOSCOPY 2/8/1969 ZOSTER VACCINE AGE 60> 12/8/2010 Pneumococcal 65+ Low/Medium Risk (1 of 2 - PCV13) 2/8/2016 EYE EXAM RETINAL OR DILATED Q1 12/14/2017 MICROALBUMIN Q1 2/7/2018 HEMOGLOBIN A1C Q6M 5/29/2018 Influenza Age 5 to Adult 8/1/2018 FOOT EXAM Q1 11/29/2018 LIPID PANEL Q1 11/29/2018 GLAUCOMA SCREENING Q2Y 12/14/2018 DTaP/Tdap/Td series (2 - Td) 6/5/2027 Alergias  Review Complete El: 6/14/2018 Por: Julián Andrade LPN A partir del:  6/14/2018 No Known Allergies Vacunas actuales [id number] el:  12/14/2015 Margarita Hankins Influenza Vaccine (Quad) PF 10/27/2015 Tdap 6/5/2017  9:26 AM, 12/14/2015 No revisadas esta visita You Were Diagnosed With   
  
 [facility] Uncontrolled type 2 diabetes mellitus without complication, without long-term current use of insulin [facility]    -  Primary ICD-10-CM: E11.65 ICD-9-CM: 250.02 Benign non-nodular prostatic hyperplasia with lower urinary tract symptoms     ICD-10-CM: N40.1 ICD-9-CM: 600.91 Dyslipidemia     ICD-10-CM: E78.5 ICD-9-CM: 272.4 Essential hypertension     ICD-10-CM: I10 
ICD-9-CM: 401.9 Encounter for immunization     ICD-10-CM: W59 ICD-9-CM: V03.89 Partes vitales PS Pulso Temperatura Resp [place] ( percentil de crecimiento) Peso (percentil de crecimiento)  150/72 (BP 1 Location: Left arm, BP Patient Position: Sitting) 66 98.1 °F (36.7 °C) (Oral) 18 5' 5\" (1.651 m) 169 lb (76.7 kg) SpO2 BMI ([facility]) Estatus de tabaquísmo 96% 28.12 kg/m2 Never Smoker Historial de signos vitales BMI and BSA Data Body Mass Index Body Surface Area  
 28.12 kg/m 2 1.88 m 2 Preferred Pharmacy Pharmacy Name Phone Annette Bonilla [address] [place] [address], [address] [place] [address] Rd. [phone] Cleary lista de medicamentos actualizada Ge Mcduffie actualizada 6/14/18  8:37 AM.  Emile Kumar use cleary lista de medicamentos más reciente. acyclovir 400 mg tablet También conocido clement:  ZOVIRAX TAKE 1 TABLET BY MOUTH 5 TIMES DAILY FOR 7 DAYS  
  
 glipiZIDE 5 mg tablet También conocido clement:  Ny Stade Take 1 Tab by mouth two (2) times a day. For diabetes  
  
 glucose blood VI test strips strip También conocido clement:  blood glucose test  
daily  
  
 ibuprofen 600 mg tablet También conocido clement:  MOTRIN Take 1 Tab by mouth every six (6) hours as needed for Pain. Lancets Misc  
daily  
  
 losartan-hydroCHLOROthiazide 50-12.5 mg per tablet También conocido clement:  [employer] Take 1 Tab by mouth daily. For blood pressure  
  
 metFORMIN  mg tablet También conocido clement:  GLUCOPHAGE XR Take 2 Tabs by mouth daily (with dinner). For diabetes  
  
 miscellaneous medical supply Misc Penile Suction device for Erectile dysfunction. pneumococcal 13 joshua conj dip 0.5 mL Syrg injection También conocido clement:  PREVNAR 13 (PF)  
0.5 mL by IntraMUSCular route once for 1 dose. rosuvastatin 10 mg tablet También conocido clement:  CRESTOR  
TAKE ONE TABLET BY MOUTH NIGHTLY FOR CHOLESTEROL  
  
 tamsulosin 0.4 mg capsule También conocido clement:  FLOMAX Take 2 Caps by mouth daily. For prostate TYLENOL 325 mg tablet Medicamento genérico:  acetaminophen Take  by mouth every four (4) hours as needed for Pain.  
  
 varicella-zoster recombinant (PF) 50 mcg/0.5 mL Susr injection También conocido clement:  SHINGRIX (PF)  
0.5 mL by IntraMUSCular route once for 1 dose. Prescriptions Printed Refills  
 varicella-zoster recombinant, PF, (SHINGRIX, PF,) 50 mcg/0.5 mL susr injection 0 Si.5 mL by IntraMUSCular route once for 1 dose. Class: Print Route: IntraMUSCular  
 pneumococcal 13 joshua conj dip (PREVNAR 13, PF,) 0.5 mL syrg injection 0 Si.5 mL by IntraMUSCular route once for 1 dose. Class: Print Route: IntraMUSCular Prescriptions Sent to Pharmacy Refills  
 losartan-hydroCHLOROthiazide (HYZAAR) 50-12.5 mg per tablet 1 Sig: Take 1 Tab by mouth daily. For blood pressure Class: Normal  
 Pharmacy: [facility] [address] DEIDRE BROWN [address] Ph #: [phone] Route: Oral  
 tamsulosin (FLOMAX) 0.4 mg capsule 1 Sig: Take 2 Caps by mouth daily. For prostate Class: Normal  
 Pharmacy: [facility] [address] [place] [address] Ph #: [phone] Route: Oral  
 metFORMIN ER (GLUCOPHAGE XR) 500 mg tablet 1 Sig: Take 2 Tabs by mouth daily (with dinner). For diabetes Class: Normal  
 Pharmacy: [facility] DR DEIDRE BROWN [address] Ph #: [phone] Route: Oral  
 glipiZIDE (GLUCOTROL) 5 mg tablet 1 Sig: Take 1 Tab by mouth two (2) times a day. For diabetes Class: Normal  
 Pharmacy: [facility] DR DEIDRE BROWN [address] Ph #: [phone] Route: Oral  
  
Hicimos lo siguiente AMB POC HEMOGLOBIN A1C [48914 CPT(R)] CBC W/O DIFF [10876 CPT(R)] LIPID PANEL [79918 CPT(R)] METABOLIC PANEL, COMPREHENSIVE [90761 CPT(R)] MICROALBUMIN, UR, RAND W/ MICROALB/CREAT RATIO [id number] CPT(R)] PSA W/ REFLX FREE PSA [36857 CPT(R)] Instrucciones de seguimiento Return in about 4 months (around 10/14/2018). Instrucciones para el Paciente Consejos de nutrición para la diabetes: Después de la consulta [Nutrition Tips for Diabetes: After Your Visit] Instrucciones de cuidado [place] saludable es importante para el manejo de la diabetes. Puede ayudarle a bajar de peso (si lo necesita) y a no recuperarlo. Esta dieta le da los nutrientes y la energía que persaud cuerpo necesita y le ayuda a prevenir enfermedades cardíacas (del corazón). Sin embargo, esto no significa que en andrew dieta para personas con diabetes usted tenga que consumir alimentos especiales. Usted [employer] mismos alimentos que persaud jd, incluso dulces ocasionalmente y otros alimentos favoritos. [place] debe tener en cuenta la cantidad y la frecuencia con que come ciertos alimentos. El plan correcto para usted incluirá alimentos que le ayuden a mantener persaud nivel de azúcar en la ciera en límites sanos. Trate de comer alimentos variados y [employer] carbohidratos chao todo el día. Los carbohidratos aumentan el nivel de azúcar en la ciera y lo hacen con mayor rapidez que otros nutrientes. Estos pueden encontrarse en el azúcar, los panes y cereales, las frutas, en los vegetales con almidones, clement las justin y [place], y en la [place] y el yogur. Sería conveniente que colabore con un dietista o educador de diabetes para que le ayuden a planificar las comidas y los refrigerios. Si naeem de silverio objetivos es la pérdida de [place], un dietista o educador de diabetes puede ayudarle. Los consejos que siguen a continuación pueden ayudarle a disfrutar silverio comidas y [employer]. La atención de seguimiento es andrew parte clave de persaud tratamiento y seguridad. Asegúrese de hacer y acudir a todas las citas, y llame a persaud médico si está teniendo problemas. También es andrew buena idea saber los resultados de los exámenes y mantener andrew lista de los medicamentos que sami. Cómo puede cuidarse en el hogar? · Aprenda a distinguir los alimentos con carbohidratos y la cantidad de carbohidratos que debe consumir.  Un dietista o educador de diabetes puede enseñarle a llevar un control de la cantidad de carbohidratos que consume. · Distribuya los carbohidratos a lo yomaira del día. Consuma andrew pequeña porción de carbohidratos en cada andrew de las comidas, rudy no demasiado de andrew valerie vez. · Planifique silverio comidas para que incluyan alimentos de todos los grupos alimentarios. Estos son los grupos alimenticios y algunos ejemplos de tamaños de porciones: ¨ Granos: 1 rebanada de pan (1 onza o 28 g), ½ taza de cereal cocido y 1/3 de taza de pasta o arroz cocidos. Estas raciones contienen alrededor de 15 gramos de carbohidratos por porción. Elija comer granos enteros, clement pan o galletas saladas integrales, suad y [place] integral, con mayor frecuencia que granos refinados. ¨ Frutas: 1 fruta pequeña fresca, clement andrew manzana o andrew naranja; ½ banana (plátano); ½ taza de fruta picada, cocida o enlatada; ½ taza de jugo de fruta; 1 taza de melón o frambuesa; y 2 cucharadas de frutas secas. Estas raciones contienen alrededor de 15 gramos de carbohidratos por porción. ¨ Productos lácteos: 1 taza de [place] y 2/3 de taza de yogur natural. Estas raciones contienen alrededor de 15 gramos de carbohidratos por porción. ¨ Proteínas: Carne de vivian, treasure, pavo, pescado, huevos, tofu, queso, requesón y [address] de [address]acahuate [place]). Andrew porción de carne es 3 onzas, que es aproximadamente el tamaño de andrew baraja de naipes. Ejemplos de porciones de sustitutos de la carne (igual a 1 onza de carne) son 1/4 de taza de requesón, 1 huevo, 1 cucharada de [address] de cacahuate y ½ taza de tofu. Estos alimentos contienen muy poca cantidad o nada de carbohidratos por porción. ¨ Vegetales: Los vegetales con almidón clement ½ taza de frijoles (habichuelas) secos cocidos, arvejas (chícharos), justin o maíz contienen alrededor de 15 gramos de carbohidratos.  Los vegetales sin almidón contienen muy pocos carbohidratos, clement 1 taza de verduras de hojas verdes crudas (clement la espinaca), ½ taza de otro vegetal (cocido o denny) y 3/4 de taza de jugo de verduras. · Use el formato del plato para planificar silverio comidas. Es andrew [place]en Rule y rápida de asegurarse de que consuma comidas balanceadas. También le ayuda a distribuir los carbohidratos chao el día. Divida el plato por tipo de alimento. [address] [place] [place] verduras en medio plato, la carne o silverio sustitutos en un cuarto del plato y los granos o verduras con almidones (clement arroz integral o andrew papa) en la cuarta parte restante del plato. A esto puede agregarle un pequeño trozo de fruta y [place] taza de [place] o yogur, según la cantidad de carbohidratos que deba consumir en andrew comida. · Consulte a persaud dietista o educador de diabetes sobre las formas de añadir cantidades limitadas de dulces en persaud plan alimenticio. Usted puede comer estos alimentos de vez en cuando, siempre que incluya la cantidad de carbohidratos que contienen en la cantidad permitida diaria. · Si sofiya alcohol, limite el consumo a no más que 1 bebida al día si es yves y 2 bebidas al día si es hombre. Si está embarazada, ninguna cantidad de alcohol es kruse. · Las proteínas, grasas y fibras no aumentan tanto el nivel de azúcar en la ciera clement los carbohidratos. Si consume muchos de estos nutrientes en andrew comida, el azúcar en la ciera aumentará con mayor lentitud de lo que lo haría de [place] u [place]. · Limite las grasas saturadas, clement las de las jorge y los productos lácteos. Trate de reemplazarlos con grasa monoinsaturada clement, por ejemplo, el aceite de [place]. Esta es andrew opción más saludable porque las personas con diabetes tienen un riesgo superior al promedio de enfermedad cardíaca. De todos modos, use andrew cantidad [place]. Andrew cucharada de aceite de banda contiene 14 gramos de grasa y 120 calorías. · Hacer ejercicio disminuye el nivel de azúcar en la ciera.  Si usted se administra la dosis de [employer] de inyecciones o Ralph bomba, puede usar andrew dosis tommy que si no hace ejercicio. Tenga en cuenta que los horarios son importantes. Si usted hace ejercicio 1 hora después de josephine comido, es posible que persaud cuerpo necesite menos insulina que si lo hace 3 horas después de la comida. Mida persaud nivel de azúcar en la ciera para saber cómo afecta el ejercicio persaud necesidad de insulina. · Bryce ejercicio la mayoría de los días de la [place]. Bryce por lo menos 30 minutos al día. El ejercicio le ayuda a mantener un peso saludable y a que persaud cuerpo use la insulina. Caminar es andrew manera fácil de hacer ejercicio. Aumente en forma gradual la distancia que camina cada día. Quizás también desee nadar, montar en bicicleta o hacer otras actividades. Cuando come afuera · Aprenda a determinar los tamaños de las porciones de alimentos que contienen carbohidratos. Si mide silverio alimentos en el hogar, será más fácil calcular la cantidad de carbohidratos en la porción del restaurante. · Si la comida que pide contiene demasiados carbohidratos (clement justin, maíz o frijoles horneados), pida en persaud lugar andrew comida baja en carbohidratos. Pida andrew ensalada o vegetales verdes. · Si [place] insulina, mídase el nivel de azúcar antes y después de salir a comer afuera para saber la cantidad que puede comer en el futuro. · Si consume más carbohidratos de lo planeado en andrew comida, camine o bryce ejercicio. [place] le ayudará a bajar el nivel de azúcar en la ciera. Dónde puede encontrar más información en inglés? Fahad Calles a Deal[employer]orer[employer]be [facility] A184 en la búsqueda para aprender más acerca de \"Consejos de nutrición para la diabetes: Después de la consulta. \"  
© 7009-6478 Healthwise, Incorporated. Instrucciones de cuidado adaptadas bajo licencia por Anai Middleton (which disclaims liability or warranty for this information).  Estas instrucciones de cuidado son para usarlas con persaud profesional clínico registrado. Si tiene preguntas acerca de andrew afección médica o de estas instrucciones, pregunte siempre a persaud profesional de [employer]. Healthwise, Incorporated niega cualquier garantía o responsabilidad por persaud uso de esta información. Versión del contenido: [id number]; Revisado: 6 agosto, 2013 Introducing [facility]! Nisreen Edwards introduces [employer] patient portal. Now you can access parts of your medical record, email your doctor's office, and request medication refills online. 1. In your internet browser, go to [email or web address] [email or web address]. [employer] 2. Click on the First Time User? Click Here link in the Sign In box. You will see the New Member Sign Up page. 3. Enter your [employer] Access Code exactly as it appears below. You will not need to use this code after youve completed the sign-up process. If you do not sign up before the expiration date, you must request a new code. · [employer] Access Code: [email or web address] Expires: 9/12/2018  8:35 AM 
 
4. Enter the last four digits of your Social Security Number (xxxx) and Date of Birth (mm/dd/yyyy) as indicated and click Submit. You will be taken to the next sign-up page. 5. Create a [employer] ID. This will be your [employer] login ID and cannot be changed, so think of one that is secure and easy to remember. 6. Create a [employer] password. You can change your password at any time. 7. Enter your Password Reset Question and Answer. This can be used at a later time if you forget your password. 8. Enter your e-mail address. You will receive e-mail notification when new information is available in [address] 9. Click Sign Up. You can now view and download portions of your medical record. 10. Click the Download Summary menu link to download a portable copy of your medical information.  
 
If you have questions, please visit the Frequently Asked Questions section of the [employer]. Remember, [facility] is NOT to be used for urgent needs. For medical emergencies, dial 911. Now available from your iPhone and Android! Please provide this summary of care documentation to your next provider. Your primary care clinician is listed as Pio Major. If you have any questions after today's visit, please call [phone].

## 2018-06-14 NOTE — PROGRESS NOTES
Cecilio Lu is a 80 y.o. male and presents for annual Medicare Wellness Visit. Problem List: Reviewed with patient and discussed risk factors. Patient Active Problem List   Diagnosis Code    Erectile dysfunction N52.9    Premature ejaculation, acquired, generalized, severe F52.4    Benign non-nodular prostatic hyperplasia with lower urinary tract symptoms N40.1    Dyslipidemia E78.5    Uncontrolled type 2 diabetes mellitus without complication, without long-term current use of insulin (HCC) E11.65    Hypertriglyceridemia E78.1    Bell's palsy G51.0    Nuclear cataract [id number]    ACP (advance care planning) Z71.89    Essential hypertension I10       Current medical providers:  Patient Care Team:  Diego Espana NP as PCP - General (Nurse Practitioner)  Milla Phillips MD (Urology)    PSH: Reviewed with patient  Past Surgical History:   Procedure Laterality Date    HX COLONOSCOPY  2013    done in [place], benign findings per patient        SH: Reviewed with patient  Social History   Substance Use Topics    Smoking status: Never Smoker    Smokeless tobacco: Never Used    Alcohol use No       FH: Reviewed with patient  Family History   Problem Relation Age of Onset    No Known Problems Mother     No Known Problems Father        Medications/Allergies: Reviewed with patient  Current Outpatient Prescriptions on File Prior to Visit   Medication Sig Dispense Refill    acetaminophen (TYLENOL) 325 mg tablet Take  by mouth every four (4) hours as needed for Pain.  acyclovir (ZOVIRAX) 400 mg tablet TAKE 1 TABLET BY MOUTH 5 TIMES DAILY FOR 7 DAYS 35 Tab 0    glucose blood VI test strips (BLOOD GLUCOSE TEST) strip daily 100 Strip 2    Lancets misc daily 100 Each 2    ibuprofen (MOTRIN) 600 mg tablet Take 1 Tab by mouth every six (6) hours as needed for Pain. 40 Tab 0    miscellaneous medical supply misc Penile Suction device for Erectile dysfunction.  1 Each 0     No current facility-administered medications on file prior to visit. No Known Allergies    Objective:  Visit Vitals    /72 (BP 1 Location: Left arm, BP Patient Position: Sitting)    Pulse 66    Temp 98.1 °F (36.7 °C) (Oral)    Resp 18    Ht 5' 5\" (1.651 m)    Wt 169 lb (76.7 kg)    SpO2 96%    BMI 28.12 kg/m2    Body mass index is 28.12 kg/(m^2). Assessment of cognitive impairment: Alert and oriented x 3    Depression Screen:   PHQ over the last two weeks 2/7/2017   Little interest or pleasure in doing things Not at all   Feeling down, depressed or hopeless Not at all   Total Score PHQ 2 0       Fall Risk Assessment:    Fall Risk Assessment, last 12 mths 2/7/2017   Able to walk? Yes   Fall in past 12 months? No       Functional Ability:   Does the patient exhibit a steady gait? yes   How long did it take the patient to get up and walk from a sitting position? 1 sec   Is the patient self reliant?  (ie can do own laundry, meals, household chores)  yes     Does the patient handle his/her own medications? yes     Does the patient handle his/her own money? yes     Is the patients home safe (ie good lighting, handrails on stairs and bath, etc.)? yes     Did you notice or did patient express any hearing difficulties? no     Did you notice or did patient express any vision difficulties?   no     Were distance and reading eye charts used? no       Advance Care Planning:   Patient was offered the opportunity to discuss advance care planning:  yes     Does patient have an Advance Directive:  no   If no, did you provide information on Caring Connections?   yes       Plan:      Orders Placed This Encounter    METABOLIC PANEL, COMPREHENSIVE    CBC W/O DIFF    LIPID PANEL    MICROALBUMIN, UR, RAND W/ MICROALB/CREAT RATIO    PSA W/ REFLX FREE PSA    AMB POC HEMOGLOBIN A1C    losartan-hydroCHLOROthiazide (HYZAAR) 50-12.5 mg per tablet    tamsulosin (FLOMAX) 0.4 mg capsule    metFORMIN ER (GLUCOPHAGE XR) 500 mg tablet    DISCONTD: glipiZIDE (GLUCOTROL) 5 mg tablet    varicella-zoster recombinant, PF, (SHINGRIX, PF,) 50 mcg/0.5 mL susr injection    pneumococcal 13 joshua conj dip (PREVNAR 13, PF,) 0.5 mL syrg injection    rosuvastatin (CRESTOR) 10 mg tablet       Health Maintenance   Topic Date Due    COLONOSCOPY  02/08/1968    ZOSTER VACCINE AGE 60>  12/08/2010    Pneumococcal 65+ Low/Medium Risk (1 of 2 - PCV13) 02/08/2016    EYE EXAM RETINAL OR DILATED Q1  12/14/2017    MICROALBUMIN Q1  02/07/2018    HEMOGLOBIN A1C Q6M  05/29/2018    Influenza Age 5 to Adult  08/01/2018    FOOT EXAM Q1  11/29/2018    LIPID PANEL Q1  11/29/2018    GLAUCOMA SCREENING Q2Y  12/14/2018    DTaP/Tdap/Td series (2 - Td) 06/05/2027    Hepatitis C Screening  Completed       *Patient verbalized understanding and agreement with the plan. A copy of the After Visit Summary with personalized health plan was given to the patient today.

## 2018-06-14 NOTE — PROGRESS NOTES
Chief Complaint   Patient presents with    Diabetes     6 month follow up     1. Have you been to the ER, urgent care clinic since your last visit? Hospitalized since your last visit? No    2. Have you seen or consulted any other health care providers outside of the [facility] since your last visit? Include any pap smears or colon screening.  No

## 2018-06-14 NOTE — PROGRESS NOTES
[facility]    Ariel Joya is a 80 y.o. male who was seen in clinic today (6/14/2018). Subjective:  Cardiovascular Review:  The patient has diabetes, hypertension and hyperlipidemia. Diet and Lifestyle: generally follows a low fat low cholesterol diet, generally follows a low sodium diet, follows a diabetic diet regularly, exercises sporadically, nonsmoker  Home BP Monitoring: is not measured at home. Pertinent ROS: not taking medications regularly as instructed, no TIA's, no chest pain on exertion, no dyspnea on exertion, no swelling of ankles. Patient reports having a colonoscopy in 2013 in [place] with normal findings. Diabetes Mellitus:  Last Hemoglobin A1c: 9.5% (11/2017)  Diabetic ROS - medication compliance: not currently taking Metformin or Glipizide at present, diabetic diet compliance: compliant most of the time, home glucose monitoring: is not checked at home, further diabetic ROS: no polyuria or polydipsia, no chest pain, dyspnea or TIA's, no numbness, tingling or pain in extremities. BPH   Patient complains of lower urinary tract symptoms. Patient reports severe symptoms of BPH. Onset of symptoms was several months ago and was gradual in onset. He reports control of symptoms with Flomax. Prior to Admission medications    Medication Sig Start Date End Date Taking? Authorizing Provider   losartan-hydroCHLOROthiazide (HYZAAR) 50-12.5 mg per tablet Take 1 Tab by mouth daily. For blood pressure 6/14/18  Yes Charlene Welsh NP   tamsulosin (FLOMAX) 0.4 mg capsule Take 2 Caps by mouth daily. For prostate 6/14/18  Yes Charlene Welsh NP   metFORMIN ER (GLUCOPHAGE XR) 500 mg tablet Take 2 Tabs by mouth daily (with dinner). For diabetes 6/14/18  Yes Charlene Welsh NP   varicella-zoster recombinant, PF, (SHINGRIX, PF,) 50 mcg/0.5 mL susr injection 0.5 mL by IntraMUSCular route once for 1 dose.  6/14/18 6/14/18 Yes Brit Barrera WAYNE Armendariz   pneumococcal 13 joshua conj dip (PREVNAR 13, PF,) 0.5 mL syrg injection 0.5 mL by IntraMUSCular route once for 1 dose. 6/14/18 6/14/18 Yes Tiffani Moreno NP   rosuvastatin (CRESTOR) 10 mg tablet TAKE ONE TABLET BY MOUTH NIGHTLY FOR CHOLESTEROL 6/14/18  Yes Tiffani Moreno NP   acetaminophen (TYLENOL) 325 mg tablet Take  by mouth every four (4) hours as needed for Pain. Yes Historical Provider   acyclovir (ZOVIRAX) 400 mg tablet TAKE 1 TABLET BY MOUTH 5 TIMES DAILY FOR 7 DAYS 3/6/17  Yes Tiffani Moreno NP   glucose blood VI test strips (BLOOD GLUCOSE TEST) strip daily 9/2/16  Yes Eduardo Gomez MD   Lancets misc daily 9/2/16  Yes Eduardo Gomez MD   ibuprofen (MOTRIN) 600 mg tablet Take 1 Tab by mouth every six (6) hours as needed for Pain. 7/22/16  Yes Nicole Howard MD   miscellaneous medical supply misc Penile Suction device for Erectile dysfunction. 10/8/15  Yes Jigar Bowen MD          No Known Allergies        ROS  See HPI    Objective:   Physical Exam   Constitutional: He is oriented to person, place, and time. He appears well-developed and well-nourished. Neck: Normal range of motion. Neck supple. No JVD present. Carotid bruit is not present. No thyromegaly present. Cardiovascular: Normal rate, regular rhythm and intact distal pulses. Exam reveals no gallop and no friction rub. No murmur heard. Pulmonary/Chest: Effort normal and breath sounds normal. No respiratory distress. Musculoskeletal: He exhibits no edema. Lymphadenopathy:     He has no cervical adenopathy. Neurological: He is alert and oriented to person, place, and time. Psychiatric: He has a normal mood and affect. His behavior is normal.   Nursing note and vitals reviewed.         Visit Vitals    /72 (BP 1 Location: Left arm, BP Patient Position: Sitting)    Pulse 66    Temp 98.1 °F (36.7 °C) (Oral)    Resp 18    Ht 5' 5\" (1.651 m)    Wt 169 lb (76.7 kg)    SpO2 96%    BMI 28.12 kg/m2       Assessment & Plan:  Diagnoses and all orders for this visit:    1. Uncontrolled type 2 diabetes mellitus without complication, without long-term current use of insulin (HCC)  AMB POC HEMOGLOBIN A1C: 7.9%. Reviewed diet and lifestyle changes. Resume Metformin.   -     METABOLIC PANEL, COMPREHENSIVE  -     CBC W/O DIFF  -     MICROALBUMIN, UR, RAND W/ MICROALB/CREAT RATIO  -     metFORMIN ER (GLUCOPHAGE XR) 500 mg tablet; Take 2 Tabs by mouth daily (with dinner). For diabetes    2. Benign non-nodular prostatic hyperplasia with lower urinary tract symptoms  Stable, no changes to current therapy  -     PSA W/ REFLX FREE PSA  -     tamsulosin (FLOMAX) 0.4 mg capsule; Take 2 Caps by mouth daily. For prostate    3. Dyslipidemia  Recheck labs  -     LIPID PANEL  -     rosuvastatin (CRESTOR) 10 mg tablet; TAKE ONE TABLET BY MOUTH NIGHTLY FOR CHOLESTEROL    4. Essential hypertension  Remains elevated. Begin Losartan. -     losartan-hydroCHLOROthiazide (HYZAAR) 50-12.5 mg per tablet; Take 1 Tab by mouth daily. For blood pressure    5. Encounter for immunization  -     varicella-zoster recombinant, PF, (SHINGRIX, PF,) 50 mcg/0.5 mL susr injection; 0.5 mL by IntraMUSCular route once for 1 dose. -     pneumococcal 13 joshua conj dip (PREVNAR 13, PF,) 0.5 mL syrg injection; 0.5 mL by IntraMUSCular route once for 1 dose. I have discussed the diagnosis with the patient and the intended plan as seen in the above orders. The patient has received an after-visit summary along with patient information handout. I have discussed medication side effects and warnings with the patient as well. Follow-up Disposition:  Return in about 4 months (around 10/14/2018).         Jonatan Borjas NP

## 2018-06-14 NOTE — PROGRESS NOTES
Met with patient today during PCP visit. Patient states he is only taking 2 medications: rosuvastatin and tamsulosin. Verified this my asking a couple different ways and verified with the pharmacist at [facility] that he never picked up the glipizide he was prescribed on 11/29/17 and his last fill of metformin was 3/6/17. Patient has a glucometer at home but doesn't currently use it as it hurts his fingers. When I asked patient to show me where he pricks his finger he showed me the sides and the pads. Recommended that he use the sides of his fingers as this will hurt less. 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 He denies drinking any soda or eating foods with a lot of sugar. Discussed the above with Isabella Pascual NP and patient to restart metformin only. Called [facility] and spoke to the pharmacist, glipizide discontinued and they will make sure patient picks up metformin, losartan/HCTZ, rosuvastatin, and tamsulosin today.     Marie Vital, PharmD, Re[facility] Ram

## 2019-01-19 DIAGNOSIS — N40.1 BENIGN NON-NODULAR PROSTATIC HYPERPLASIA WITH LOWER URINARY TRACT SYMPTOMS: ICD-10-CM

## 2019-01-21 RX ORDER — TAMSULOSIN HYDROCHLORIDE 0.4 MG/1
CAPSULE ORAL
Qty: 60 CAP | Refills: 5 | Status: SHIPPED | OUTPATIENT
Start: 2019-01-21

## 2022-03-18 PROBLEM — G51.0 BELL'S PALSY: Status: ACTIVE | Noted: 2017-02-02

## 2022-03-19 PROBLEM — Z71.89 ACP (ADVANCE CARE PLANNING): Status: ACTIVE | Noted: 2017-02-07

## 2022-03-20 PROBLEM — I10 ESSENTIAL HYPERTENSION: Status: ACTIVE | Noted: 2018-06-14

## 2023-05-18 RX ORDER — TAMSULOSIN HYDROCHLORIDE 0.4 MG/1
CAPSULE ORAL
COMMUNITY
Start: 2019-01-21

## 2023-05-18 RX ORDER — ACETAMINOPHEN 325 MG/1
TABLET ORAL EVERY 4 HOURS PRN
COMMUNITY

## 2023-05-18 RX ORDER — LOSARTAN POTASSIUM AND HYDROCHLOROTHIAZIDE 12.5; 5 MG/1; MG/1
1 TABLET ORAL DAILY
COMMUNITY
Start: 2018-06-14

## 2023-05-18 RX ORDER — IBUPROFEN 600 MG/1
600 TABLET ORAL EVERY 6 HOURS PRN
COMMUNITY
Start: 2016-07-22

## 2023-05-18 RX ORDER — ACYCLOVIR 400 MG/1
TABLET ORAL
COMMUNITY
Start: 2017-03-06

## 2023-05-18 RX ORDER — LANCETS 30 GAUGE
EACH MISCELLANEOUS
COMMUNITY
Start: 2016-09-02

## 2023-05-18 RX ORDER — METFORMIN HYDROCHLORIDE 500 MG/1
1000 TABLET, EXTENDED RELEASE ORAL
COMMUNITY
Start: 2018-06-14

## 2023-05-18 RX ORDER — ROSUVASTATIN CALCIUM 10 MG/1
TABLET, COATED ORAL
COMMUNITY
Start: 2018-06-14

## 2025-03-12 NOTE — PATIENT INSTRUCTIONS
Consejos de nutrición para la diabetes: Después de la consulta  [Nutrition Tips for Diabetes: After Your Visit]  Instrucciones de cuidado  Andrew dieta saludable es importante para el manejo de la diabetes. Puede ayudarle a bajar de peso (si lo necesita) y a no recuperarlo. Esta dieta le da los nutrientes y la energía que persaud cuerpo necesita y le ayuda a prevenir enfermedades cardíacas (del corazón). Sin embargo, esto no significa que en andrew dieta para personas con diabetes usted tenga que consumir alimentos especiales. Usted [employer] mismos alimentos que persaud jd, incluso dulces ocasionalmente y otros alimentos favoritos. [place] debe tener en cuenta la cantidad y la frecuencia con que come ciertos alimentos. El plan correcto para usted incluirá alimentos que le ayuden a mantener persaud nivel de azúcar en la ciera en límites sanos. Trate de comer alimentos variados y [employer] carbohidratos chao todo el día. Los carbohidratos aumentan el nivel de azúcar en la ciera y lo hacen con mayor rapidez que otros nutrientes. Estos pueden encontrarse en el azúcar, los panes y cereales, las frutas, en los vegetales con almidones, clement las justin y [place], y en la [place] y el yogur. Sería conveniente que colabore con un dietista o educador de diabetes para que le ayuden a planificar las comidas y los refrigerios. Si naeem de silverio objetivos es la pérdida de [place], un dietista o educador de diabetes puede ayudarle. Los consejos que siguen a continuación pueden ayudarle a disfrutar silverio comidas y [employer]. La atención de seguimiento es andrew parte clave de persaud tratamiento y seguridad. Asegúrese de hacer y acudir a todas las citas, y llame a persaud médico si está teniendo problemas. También es andrew buena idea saber los resultados de los exámenes y mantener andrew lista de los medicamentos que sami. ¿Cómo puede cuidarse en el hogar?   · Aprenda a distinguir los alimentos con carbohidratos y la cantidad de What Type Of Note Output Would You Prefer (Optional)?: Bullet Format How Severe Are Your Spot(S)?: mild carbohidratos que debe consumir. Un dietista o educador de diabetes puede enseñarle a llevar un control de la cantidad de carbohidratos que consume. · Distribuya los carbohidratos a lo yomaira del día. Consuma andrew pequeña porción de carbohidratos en cada andrew de las comidas, rudy no demasiado de andrew valerie vez. · Planifique silverio comidas para que incluyan alimentos de todos los grupos alimentarios. Estos son Odella Lincoln y algunos ejemplos de tamaños de porciones:  ¨ Granos: 1 rebanada de pan (1 onza o 28 g), ½ taza de cereal cocido y 1/3 de taza de pasta o arroz cocidos. Estas raciones contienen alrededor de 15 gramos de carbohidratos por porción. Elija comer granos enteros, clement pan o galletas saladas integrales, suad y Laretta Drummer integral, con mayor frecuencia que granos refinados. ¨ Frutas: 1 fruta pequeña fresca, clement andrew manzana o andrew naranja; ½ banana (plátano); ½ taza de fruta picada, cocida o enlatada; ½ taza de jugo de fruta; 1 taza de melón o frambuesa; y 2 cucharadas de frutas secas. Estas raciones contienen alrededor de 15 gramos de carbohidratos por porción. ¨ Productos lácteos: 1 taza de [place] y 2/3 de taza de yogur natural. Estas raciones contienen alrededor de 15 gramos de carbohidratos por porción. ¨ Proteínas: Carne de vivian, treasure, pavo, pescado, huevos, tofu, queso, requesón y [place] de cacahuate [place]). Andrew porción de carne es 3 onzas, que es aproximadamente el tamaño de andrew baraja de naipes. Ejemplos de porciones de sustitutos de la carne (igual a 1 onza de carne) son 1/4 de taza de requesón, 1 huevo, 1 cucharada de [facility] [place] de cacahuate y ½ taza de tofu. Estos alimentos contienen muy poca cantidad o nada de carbohidratos por porción. ¨ Vegetales: Los vegetales con almidón clement ½ taza de frijoles (habichuelas) secos cocidos, arvejas (chícharos), justin o maíz contienen alrededor de 15 gramos de carbohidratos.  Los vegetales sin almidón contienen [employer] Have Your Spot(S) Been Treated In The Past?: has not been treated carbohidratos, clement 1 taza de verduras de hojas verdes crudas (clement la espinaca), ½ taza de otro vegetal (cocido o denny) y 3/4 de taza de jugo de verduras. · Use el formato del plato para planificar silverio comidas. Es andrew Rondel Kras y rápida de asegurarse de que consuma comidas balanceadas. También le ayuda a distribuir los carbohidratos chao el día. Divida el plato por tipo de alimento. [address] [facility] verduras en medio plato, la carne o silverio sustitutos en un cuarto del plato y los granos o verduras con almidones (clement arroz integral o anderw papa) en la cuarta parte restante del plato. A esto puede agregarle un pequeño trozo de fruta y [facility] taza de [place] o yogur, según la cantidad de carbohidratos que deba consumir en andrew comida. · Consulte a persaud dietista o educador de diabetes sobre las formas de añadir cantidades limitadas de dulces en persaud plan alimenticio. Usted puede comer estos alimentos de vez en cuando, siempre que incluya la cantidad de carbohidratos que contienen en la cantidad permitida diaria. · Si sofiya alcohol, limite el consumo a no más que 1 bebida al día si es yves y 2 bebidas al día si es hombre. Si está embarazada, ninguna cantidad de alcohol es kruse. · Las proteínas, grasas y fibras no aumentan tanto el nivel de azúcar en la ciera clement los carbohidratos. Si consume muchos de estos nutrientes en andrew comida, el azúcar en la ciera aumentará con mayor lentitud de lo que lo haría de Mireya Guzman. · Limite las grasas saturadas, clement las de las jorge y los productos lácteos. Trate de reemplazarlos con grasa monoinsaturada clement, por ejemplo, el aceite de [place]. Esta es andrew opción más saludable porque las personas con diabetes tienen un riesgo superior al promedio de enfermedad cardíaca. De todos modos, use andrew cantidad [place] de [place]. Andrew cucharada de aceite de banda contiene 14 gramos de grasa y 120 calorías. · Hacer ejercicio disminuye el nivel de azúcar en la ciera.  Si usted se Hpi Title: Evaluation of Skin Lesions administra la dosis de [employer] de inyecciones o Martha Demark bomba, puede usar andrew dosis tommy que si no hace ejercicio. Tenga en cuenta que los horarios son importantes. Si usted hace ejercicio 1 hora después de josephine comido, es posible que persaud cuerpo necesite menos insulina que si lo hace 3 horas después de la comida. Mida persaud nivel de azúcar en la ciera para saber cómo afecta el ejercicio persaud necesidad de insulina. · Bryce ejercicio la mayoría de los días de la [place]. Bryce por lo menos 30 minutos al día. El ejercicio le ayuda a mantener un peso saludable y a que persaud cuerpo use la insulina. Caminar es andrew manera fácil de hacer ejercicio. Aumente en forma gradual la distancia que camina cada día. Quizás también desee nadar, montar en bicicleta o hacer otras actividades. Cuando come afuera  · Aprenda a [employer] de las porciones de alimentos que contienen carbohidratos. Si mide silverio alimentos en el hogar, será más fácil calcular la cantidad de carbohidratos en la porción del restaurante. · Si la comida que pide contiene demasiados carbohidratos (clement justin, maíz o frijoles horneados), pida en persaud lugar andrew comida baja en carbohidratos. Pida andrew ensalada o vegetales verdes. · Si [place] insulina, mídase el nivel de azúcar antes y después de salir a comer afuera para saber la cantidad que puede comer en el futuro. · Si consume más carbohidratos de lo planeado en andrew comida, camine o bryce ejercicio. [place] le ayudará a bajar el nivel de azúcar en la ciera. ¿Dónde puede encontrar más información en inglés? Nohemi Asif a [employer]jesús  [facility] [id number] en la búsqueda para aprender más acerca de \"Consejos de nutrición para la diabetes: Después de la consulta. \"   © 9457-8867 Healthwise, Incorporated. Instrucciones de cuidado adaptadas bajo licencia por Klarissa Salmons (which disclaims liability or warranty for this information).  Estas instrucciones de cuidado son para usarlas con persaud profesional clínico registrado. Si tiene preguntas acerca de andrew afección médica o de estas instrucciones, pregunte siempre a persaud profesional de [employer]. Healthwise, Incorporated niega cualquier garantía o responsabilidad por persaud uso de esta información.   Versión del contenido: [id number]; Revisado: 6 agosto, 2013